# Patient Record
Sex: FEMALE | Race: OTHER | Employment: FULL TIME | URBAN - METROPOLITAN AREA
[De-identification: names, ages, dates, MRNs, and addresses within clinical notes are randomized per-mention and may not be internally consistent; named-entity substitution may affect disease eponyms.]

---

## 2017-04-10 ENCOUNTER — HOSPITAL ENCOUNTER (OUTPATIENT)
Dept: OTHER | Age: 27
Discharge: HOME | End: 2017-04-30
Attending: OBSTETRICS & GYNECOLOGY | Admitting: OBSTETRICS & GYNECOLOGY

## 2017-04-17 ENCOUNTER — ROUTINE PRENATAL (OUTPATIENT)
Dept: PERINATAL CARE | Age: 27
End: 2017-04-17

## 2017-04-17 DIAGNOSIS — O99.212 OBESITY COMPLICATING PREGNANCY IN SECOND TRIMESTER: ICD-10-CM

## 2017-04-17 DIAGNOSIS — Z36.89 ENCOUNTER FOR FETAL ANATOMIC SURVEY: Primary | ICD-10-CM

## 2017-05-15 ENCOUNTER — ROUTINE PRENATAL (OUTPATIENT)
Dept: PERINATAL CARE | Age: 27
End: 2017-05-15

## 2017-05-15 DIAGNOSIS — O99.213 OBESITY COMPLICATING PREGNANCY IN THIRD TRIMESTER: Primary | ICD-10-CM

## 2017-06-05 ENCOUNTER — ROUTINE PRENATAL (OUTPATIENT)
Dept: PERINATAL CARE | Age: 27
End: 2017-06-05

## 2017-06-05 DIAGNOSIS — O99.213 OBESITY COMPLICATING PREGNANCY IN THIRD TRIMESTER: Primary | ICD-10-CM

## 2017-06-12 ENCOUNTER — ROUTINE PRENATAL (OUTPATIENT)
Dept: PERINATAL CARE | Age: 27
End: 2017-06-12

## 2017-06-12 DIAGNOSIS — O99.213 OBESITY COMPLICATING PREGNANCY IN THIRD TRIMESTER: Primary | ICD-10-CM

## 2017-06-19 ENCOUNTER — ROUTINE PRENATAL (OUTPATIENT)
Dept: PERINATAL CARE | Age: 27
End: 2017-06-19

## 2017-06-19 DIAGNOSIS — O99.213 OBESITY COMPLICATING PREGNANCY IN THIRD TRIMESTER: Primary | ICD-10-CM

## 2017-06-26 ENCOUNTER — ROUTINE PRENATAL (OUTPATIENT)
Dept: PERINATAL CARE | Age: 27
End: 2017-06-26

## 2017-06-26 DIAGNOSIS — O99.213 OBESITY COMPLICATING PREGNANCY IN THIRD TRIMESTER: Primary | ICD-10-CM

## 2017-07-03 ENCOUNTER — ROUTINE PRENATAL (OUTPATIENT)
Dept: PERINATAL CARE | Age: 27
End: 2017-07-03

## 2017-07-03 DIAGNOSIS — O99.213 OBESITY COMPLICATING PREGNANCY IN THIRD TRIMESTER: Primary | ICD-10-CM

## 2017-07-10 ENCOUNTER — ROUTINE PRENATAL (OUTPATIENT)
Dept: PERINATAL CARE | Age: 27
End: 2017-07-10

## 2017-07-10 DIAGNOSIS — O99.213 OBESITY COMPLICATING PREGNANCY IN THIRD TRIMESTER: Primary | ICD-10-CM

## 2017-07-15 ENCOUNTER — HOSPITAL ENCOUNTER (OUTPATIENT)
Dept: OTHER | Age: 27
Discharge: HOME | End: 2017-09-19
Attending: ADVANCED PRACTICE MIDWIFE | Admitting: ADVANCED PRACTICE MIDWIFE

## 2017-07-17 ENCOUNTER — ROUTINE PRENATAL (OUTPATIENT)
Dept: PERINATAL CARE | Age: 27
End: 2017-07-17

## 2017-07-17 VITALS
SYSTOLIC BLOOD PRESSURE: 106 MMHG | BODY MASS INDEX: 35.02 KG/M2 | DIASTOLIC BLOOD PRESSURE: 71 MMHG | HEART RATE: 97 BPM | WEIGHT: 204 LBS

## 2017-07-17 DIAGNOSIS — O99.213 OBESITY COMPLICATING PREGNANCY IN THIRD TRIMESTER: Primary | ICD-10-CM

## 2017-07-17 DIAGNOSIS — Z36.9 ANTENATAL SCREENING ENCOUNTER: ICD-10-CM

## 2017-07-27 ENCOUNTER — ROUTINE PRENATAL (OUTPATIENT)
Dept: PERINATAL CARE | Age: 27
End: 2017-07-27

## 2017-07-27 DIAGNOSIS — Z36.9 ANTENATAL SCREENING ENCOUNTER: ICD-10-CM

## 2017-07-27 DIAGNOSIS — O99.213 OBESITY COMPLICATING PREGNANCY IN THIRD TRIMESTER: Primary | ICD-10-CM

## 2017-08-07 PROBLEM — O48.0 POST TERM PREGNANCY AT 41 WEEKS GESTATION: Status: ACTIVE | Noted: 2017-08-07

## 2017-08-07 PROBLEM — Z3A.41 POST TERM PREGNANCY AT 41 WEEKS GESTATION: Status: ACTIVE | Noted: 2017-08-07

## 2021-01-26 ENCOUNTER — TELEPHONE (OUTPATIENT)
Dept: BARIATRICS/WEIGHT MGMT | Age: 31
End: 2021-01-26

## 2021-03-10 ENCOUNTER — TELEPHONE (OUTPATIENT)
Dept: BARIATRICS/WEIGHT MGMT | Age: 31
End: 2021-03-10

## 2021-03-10 NOTE — TELEPHONE ENCOUNTER
Called as a new pt courtesy call - left message. Told patient to have new pt paperwork completely filled out, insurance card, and id and to arrive on time to appointment. If they didn't have the paperwork filled out and arrive on time may be rescheduled. Also stated if they didn't receive paperwork to let us know so we could get it to them another way. Left office number on message.  Told to arrive @ 21

## 2021-03-11 ENCOUNTER — OFFICE VISIT (OUTPATIENT)
Dept: BARIATRICS/WEIGHT MGMT | Age: 31
End: 2021-03-11
Payer: MEDICAID

## 2021-03-11 VITALS
WEIGHT: 263 LBS | HEIGHT: 64 IN | BODY MASS INDEX: 44.9 KG/M2 | TEMPERATURE: 97.4 F | RESPIRATION RATE: 18 BRPM | OXYGEN SATURATION: 98 % | DIASTOLIC BLOOD PRESSURE: 69 MMHG | HEART RATE: 99 BPM | SYSTOLIC BLOOD PRESSURE: 110 MMHG

## 2021-03-11 DIAGNOSIS — Z01.818 PREOPERATIVE CLEARANCE: ICD-10-CM

## 2021-03-11 DIAGNOSIS — M54.41 CHRONIC MIDLINE LOW BACK PAIN WITH RIGHT-SIDED SCIATICA: ICD-10-CM

## 2021-03-11 DIAGNOSIS — E66.01 MORBID OBESITY WITH BMI OF 45.0-49.9, ADULT (HCC): Primary | ICD-10-CM

## 2021-03-11 DIAGNOSIS — I10 ESSENTIAL HYPERTENSION: ICD-10-CM

## 2021-03-11 DIAGNOSIS — K21.9 CHRONIC GERD: ICD-10-CM

## 2021-03-11 DIAGNOSIS — G89.29 CHRONIC MIDLINE LOW BACK PAIN WITH RIGHT-SIDED SCIATICA: ICD-10-CM

## 2021-03-11 PROCEDURE — 99245 OFF/OP CONSLTJ NEW/EST HI 55: CPT | Performed by: SURGERY

## 2021-03-11 PROCEDURE — 1036F TOBACCO NON-USER: CPT | Performed by: SURGERY

## 2021-03-11 PROCEDURE — G8484 FLU IMMUNIZE NO ADMIN: HCPCS | Performed by: SURGERY

## 2021-03-11 PROCEDURE — G8427 DOCREV CUR MEDS BY ELIG CLIN: HCPCS | Performed by: SURGERY

## 2021-03-11 PROCEDURE — G8419 CALC BMI OUT NRM PARAM NOF/U: HCPCS | Performed by: SURGERY

## 2021-03-11 RX ORDER — AMLODIPINE BESYLATE 10 MG/1
TABLET ORAL DAILY
COMMUNITY
Start: 2021-03-02

## 2021-03-11 SDOH — HEALTH STABILITY: MENTAL HEALTH: HOW OFTEN DO YOU HAVE A DRINK CONTAINING ALCOHOL?: 2-3 TIMES A WEEK

## 2021-03-11 NOTE — PROGRESS NOTES
[facility]) Physicians   [employer] [facility] [employer]  Fidencio Stevens MD, [address] [facility], [address] [facility] [address] . Phone: [phone]  Fax: [phone]       Chief Complaint   Patient presents with    Bariatric, Initial Visit     NP, Creta Dakin           HPI:    Connor Tejada is a very pleasant 26 y.o. obese female ,   Body mass index is 45.14 kg/m². And multiple medical problems who is presenting for weight loss surgery evaluation and consultation by Dr. Kenyatta Zabala. Patient has been struggling for several years now with obesity. Patient feels the weight is an obstacle to achieve and perform things in daily living as well risk on health. Tries to diet, and exercise but can't keep the weight off. Patient tried Atkins Diet, Weight Watcher Anonymous, Cabbage Soup Diet, low carb and calorie restriction. Patient has participated in meal replacement/liquid diets - Slimfast.  Patient has participated in weight loss medications - Adipex 10 years ago and other regimens, but with no sustainable weight loss. Patient  is very determined to lose weight and be healthy, and is interested in surgical weight loss for future weight loss. .    Otherwise patient denies any nausea, vomiting, fevers, chills, shortness of breath, chest pain, constipation or urinary symptoms.         Obesity related problems Kevin Blair is dealing with:  Patient Active Problem List   Diagnosis     (spontaneous vaginal delivery)    Post term pregnancy at 39 weeks gestation    Essential hypertension    Preoperative clearance    Morbid obesity with BMI of 45.0-49.9, adult ([address]    Chronic GERD    Chronic midline low back pain with right-sided sciatica           Pain Assessment   Denies any abdominal pain     Past Medical History:   Diagnosis Date    Asthma     associated with seasonal allergies    Hypertension     Pruritic urticarial papules and plaques of pregnancy      Past Surgical History: Patient is oriented to person, place, and time. Vital signs are normal. Patient  appears well-developed and well-nourished. Patient  is active and cooperative. Non-toxic appearance. No distress. HENT:   Head: Normocephalic and atraumatic. Head is without laceration. Right Ear: External ear normal. No lacerations. No drainage, swelling or tenderness. Left Ear: External ear normal. No lacerations. No drainage, swelling or tenderness. Nose/Mouth/Throat: Patient is wearing mask due to Covid-19 pandemic precautions, following CDC and health authorities guidelines. Eyes: Conjunctivae, EOM and lids are normal. Pupils are equal, round, and reactive to light. Right eye exhibits no discharge. No foreign body present in the right eye. Left eye exhibits no discharge. No foreign body present in the left eye. No scleral icterus. Neck: Trachea normal and normal range of motion. Neck supple. No JVD present. No tracheal tenderness present. Carotid bruit is not present. No rigidity. No tracheal deviation and no edema present. No thyromegaly present. Cardiovascular: Normal rate, regular rhythm, normal heart sounds, intact distal pulses and normal pulses. Pulmonary/Chest: Effort normal and breath sounds normal. No stridor. No respiratory distress. Patient  has no wheezes. Patient has no rales. Patient exhibits no tenderness and no crepitus. Abdominal: Soft. Normal appearance and bowel sounds are normal. Patient exhibits no distension, no abdominal bruit, no ascites and no mass. There is no hepatosplenomegaly. There is no tenderness. There is no rigidity, no rebound, no guarding and no CVA tenderness. No hernia. Hernia confirmed negative in the ventral area. Musculoskeletal: Normal range of motion. Patient exhibits no edema or tenderness. Lymphadenopathy:        Head (right side): No submental, no submandibular, no preauricular, no posterior auricular and no occipital adenopathy present.         Head (left side): No submental, no submandibular, no preauricular, no posterior auricular and no occipital adenopathy present. Patient  has no cervical adenopathy. Right: No supraclavicular adenopathy present. Left: No supraclavicular adenopathy present. Neurological: Patient is alert and oriented to person, place, and time. Patient has normal strength. Coordination and gait normal. GCS eye subscore is 4. GCS verbal subscore is 5. GCS motor subscore is 6. Skin: Skin is warm and dry. No abrasion and no rash noted. Patient  is not diaphoretic. No cyanosis or erythema. Psychiatric: Patient has a normal mood and affect. speech is normal and behavior is normal. Cognition and memory are normal.         Ankita Adams was seen today for bariatric, initial visit. Diagnoses and all orders for this visit:    Morbid obesity with BMI of 45.0-49.9, adult ([facility] [address]  -     CBC Auto Differential; Future  -     Comprehensive Metabolic Panel; Future  -     Hemoglobin A1C; Future  -     Iron and TIBC; Future  -     Lipid Panel; Future  -     TSH with Reflex; Future  -     Vitamin A; Future  -     Vitamin B1, Whole Blood; Future  -     Vitamin B12 & Folate; Future  -     Vitamin D 25 Hydroxy; Future  -     Vitamin E; Future  -     Protime-INR; Future  -     Ambulatory referral to Cardiology    Essential hypertension  -     CBC Auto Differential; Future  -     Comprehensive Metabolic Panel; Future  -     Hemoglobin A1C; Future  -     Iron and TIBC; Future  -     Lipid Panel; Future  -     TSH with Reflex; Future  -     Vitamin A; Future  -     Vitamin B1, Whole Blood; Future  -     Vitamin B12 & Folate; Future  -     Vitamin D 25 Hydroxy; Future  -     Vitamin E; Future  -     Protime-INR; Future  -     Ambulatory referral to Cardiology    Preoperative clearance  -     CBC Auto Differential; Future  -     Comprehensive Metabolic Panel; Future  -     Hemoglobin A1C; Future  -     Iron and TIBC; Future  -     Lipid Panel;  Future  -     TSH with Reflex; Future  -     Vitamin A; Future  -     Vitamin B1, Whole Blood; Future  -     Vitamin B12 & Folate; Future  -     Vitamin D 25 Hydroxy; Future  -     Vitamin E; Future  -     Protime-INR; Future  -     Ambulatory referral to Cardiology    Chronic GERD  -     CBC Auto Differential; Future  -     Comprehensive Metabolic Panel; Future  -     Hemoglobin A1C; Future  -     Iron and TIBC; Future  -     Lipid Panel; Future  -     TSH with Reflex; Future  -     Vitamin A; Future  -     Vitamin B1, Whole Blood; Future  -     Vitamin B12 & Folate; Future  -     Vitamin D 25 Hydroxy; Future  -     Vitamin E; Future  -     Protime-INR; Future  -     Ambulatory referral to Cardiology    Chronic midline low back pain with right-sided sciatica  -     CBC Auto Differential; Future  -     Comprehensive Metabolic Panel; Future  -     Hemoglobin A1C; Future  -     Iron and TIBC; Future  -     Lipid Panel; Future  -     TSH with Reflex; Future  -     Vitamin A; Future  -     Vitamin B1, Whole Blood; Future  -     Vitamin B12 & Folate; Future  -     Vitamin D 25 Hydroxy; Future  -     Vitamin E; Future  -     Protime-INR; Future  -     Ambulatory referral to Cardiology          A/P  Katiana Vela is a very pleasant 26 y.o. female with Obesity,  Body mass index is 45.14 kg/m². and multiple obesity related co-morbidities. Katiana Vela is very motivated to lose weight and being more healthy. We discussed how her weight affects her overall health including:  Patient Active Problem List   Diagnosis     (spontaneous vaginal delivery)    Post term pregnancy at 39 weeks gestation    Essential hypertension    Preoperative clearance    Morbid obesity with BMI of 45.0-49.9, adult ([facility]    Chronic GERD    Chronic midline low back pain with right-sided sciatica      The patient underwent extensive dietary counseling. I have reviewed, discussed and agree with the dietary plan.   Medical weight loss and different surgical options were discussed in details with patient. Fredi Arriaga is interested in surgical weight loss for future weight loss. Patient is interested in Laparoscopic Sleeve Gastrectomy, which I believe is an excellent option. We will proceed with pre-operative work up labs and studies. Will also petition patient's  insurance for approval for this procedure. I advised the patient that we can't guarantee final insurance approval.    Patient received dietary handouts and education. Patient advised that its their responsibility to follow up for studies, referrals and/or labs ordered today. Also discussed in details the importance of follow up, as well following the recommendations and completing the whole program to improve outcomes when it comes to healthier lifestyle as well weight loss. Patient also advised about risks and benefits being on a strict dietary regimen as well using supplements. Patient agrees and wants to proceed with weight loss planning     Obesity as a disease is considered a high risk to patients overall health and should therefore be considered a high risk disease state. Now with Covid-19 pandemic, CDC and health authorities does classify obese patients as vulnerable and high risk as well. Which makes weight loss a priority for improvement of their wellbeing and overall health. CDC has issued the following statement as far Obese patients being at Increased Risk of being critically ill from SARS-Cov-2  \"Severe obesity increases the risk of a serious breathing problem called acute respiratory distress syndrome (ARDS), which is a major complication of AWQZK-21 and can cause difficulties with a doctors ability to provide respiratory support for seriously ill patients. People living with severe obesity can have multiple serious chronic diseases and underlying health conditions that can increase the risk of severe illness from COVID-19. \"       Patient Instructions   Patient received dietary handouts and education. Pre-operative work up Ordered:    - [employer]. - Psych Evaluation.   - Cardiac Clearance. - EGD (Upper Endoscopy). - Support Group Attendance. - Obtain letter of medical necessity (PCP Letter). - Quit Smoking,  Alcohol, Caffeine and Carbonated Drinks  - Obtain records for Weight History 2 yrs. - Start Regular Exercise and track your activities. - Start Tracking your food Intake and follow dietary guidelines. - Avoid Pregnancy for 2 yrs from date of surgery. (for female patients in childbearing age)  - F/U in 4 weeks. - F/U with Behaviorist         Patient advised that its their responsibility to follow up for studies, referrals and/or labs ordered today. Please note that some or all of this report was generated using voice recognition software. Please notify me in case of any questions about the content of this document, as some errors in transcription may have occurred .

## 2021-03-11 NOTE — PROGRESS NOTES
Aimee Padilla is a 26 y.o. female with a date of birth of 1990. Vitals:    03/11/21 1058   BP: 110/69   Pulse: 99   Resp: 18   Temp: 97.4 °F (36.3 °C)   SpO2: 98%    BMI: Body mass index is 45.14 kg/m². Obesity Classification: Class III    Weight History: Wt Readings from Last 3 Encounters:   03/11/21 263 lb (119.3 kg)   12/15/17 180 lb (81.6 kg)   08/06/17 200 lb (90.7 kg)       Patient's lowest adult weight was 185 lbs at age 32. Patient's highest adult weight was 263.8 lbs at age 26. Patient has participated in the following weight loss programs: Atkins Diet, Weight Watcher Anonymous, Cabbage Soup Diet, low carb and calorie restriction. Patient has participated in meal replacement/liquid diets - Slimfast.  Patient has participated in weight loss medications - Adipex 10 years go. Patient is not lactose intolerant. Patient does not have Pentecostalism/cultural food concerns. Patient does not have food allergies. Patient does tolerate artificial sweeteners. Patient does have a regular sleep/wake schedule; she feels like she sleeps pretty well  24 hour recall/food frequency chart:  Breakfast: no. water  Snack: no.   Lunch: yes. Fast food - burger and ff, regular soda [employer]  Snack: yes. Candy or chips  Dinner: yes. Fast food OR pasta, chix, veg  Snack: yes. Bowl of cereal OR ramen OR chips, soda  Drinks throughout the day: water, soda - 2 cans  Do you drink alcohol? Yes. How often/how much alcohol do you drink: 3 Glasses of wine per week. Patient does not meet the criteria for binge eating disorder. Patient does not have grazing. Patient does not have night eating. Patient does have a history of emotional eating or eating out of boredom. Surgery  Patient does feel confident in her ability to make these changes. The patient's expectations of post-surgical eating habits are realistic.     Patient states she does understand the consequences of not complying with post-op food guidelines. Patient states she does understands the long term changes in food intake that will be necessary for all occasions after surgery for the rest of her life. Patient is deemed nutritionally appropriate to proceed. Goals  Weight: 160-170  Health Improvement: improve HTN, asthma; avoid weight related illnesses    Assessment  Nutritional Needs: RMR=(9.99 x 119) + (6.25 x 163) - (4.92 x 30 y.o.) -161  = 1890 kcal x 1.4 (sedentary activity factor)= 2646 kcal - 1000 (for 2 lb weight loss/week)= 1646 kcal.    Plan  Plan/Recommendations: Start presurgical guidelines. Goals:   -Eat 4-5 times daily  -Avoid high fat and high sugar foods  -Include protein with all meals and snacks  -Avoid carbonation and caffeine  -Avoid calorie containing beverages  -Increase physical activity as tolerated    PES Statement:  Overweight/Obesity related to lack of exercise, sedentary lifestyle, unhealthy eating habits, and unsuccessful diet attempts as evidenced by BMI. Body mass index is 45.14 kg/m². Will follow up as necessary.     Mir Man

## 2021-03-11 NOTE — PATIENT INSTRUCTIONS
Patient received dietary handouts and education. Pre-operative work up Ordered:    - [employer]. - Psych Evaluation.   - Cardiac Clearance. - EGD (Upper Endoscopy). - Support Group Attendance. - Obtain letter of medical necessity (PCP Letter). - Quit Smoking,  Alcohol, Caffeine and Carbonated Drinks  - Obtain records for Weight History 2 yrs. - Start Regular Exercise and track your activities. - Start Tracking your food Intake and follow dietary guidelines. - Avoid Pregnancy for 2 yrs from date of surgery. (for female patients in childbearing age)  - F/U in 4 weeks. - F/U with Behaviorist         Patient advised that its their responsibility to follow up for studies, referrals and/or labs ordered today.

## 2021-04-06 ENCOUNTER — OFFICE VISIT (OUTPATIENT)
Dept: CARDIOLOGY CLINIC | Age: 31
End: 2021-04-06
Payer: MEDICAID

## 2021-04-06 VITALS
BODY MASS INDEX: 45.41 KG/M2 | HEART RATE: 95 BPM | HEIGHT: 64 IN | DIASTOLIC BLOOD PRESSURE: 68 MMHG | SYSTOLIC BLOOD PRESSURE: 112 MMHG | WEIGHT: 266 LBS | OXYGEN SATURATION: 95 %

## 2021-04-06 DIAGNOSIS — I10 ESSENTIAL HYPERTENSION: ICD-10-CM

## 2021-04-06 DIAGNOSIS — Z01.818 PREOPERATIVE CLEARANCE: Primary | ICD-10-CM

## 2021-04-06 PROCEDURE — 93000 ELECTROCARDIOGRAM COMPLETE: CPT | Performed by: INTERNAL MEDICINE

## 2021-04-06 PROCEDURE — 99203 OFFICE O/P NEW LOW 30 MIN: CPT | Performed by: INTERNAL MEDICINE

## 2021-04-06 PROCEDURE — G8427 DOCREV CUR MEDS BY ELIG CLIN: HCPCS | Performed by: INTERNAL MEDICINE

## 2021-04-06 PROCEDURE — G8417 CALC BMI ABV UP PARAM F/U: HCPCS | Performed by: INTERNAL MEDICINE

## 2021-04-06 PROCEDURE — 1036F TOBACCO NON-USER: CPT | Performed by: INTERNAL MEDICINE

## 2021-04-06 RX ORDER — MONTELUKAST SODIUM 5 MG/1
5 TABLET, CHEWABLE ORAL NIGHTLY
COMMUNITY

## 2021-04-06 NOTE — LETTER
[address] Cardiology - [address] [place] [id number] [facility]  Phone: [phone]  Fax: [phone]    Clarence Wolfe MD        April 6, 2021    [address]  [address] [place] [address]      To Whom It May Concern,     Emy Benítez 1990 is at low cardiac risk for surgery. If you have any questions or concerns, please don't hesitate to call.     Sincerely,         Clarence Wolfe MD

## 2021-04-06 NOTE — PROGRESS NOTES
[address]   CARDIAC EVALUATION NOTE  [phone]      PCP:  Perez Hill DO    Reason for Consultation/Chief Complaint: preop eval for wt loss surgery     Subjective   History of Present Illness:  Nuha Luis is a 26 y.o. patient with a history of HTN who presents for pre-operative cardiac clearance for bariatric surgery. She reports she is not as active due to losing her job a year ago due to the [place] pandemic. She denies DM or pregnancy issues. She denies HTN during pregnancies. She has been on BP medications for 2 years. She denies smoking. She is active with her children. She denies CP, palpitations, dizziness or syncope. She reports she can walk without limitations. Past Medical History:   has a past medical history of Asthma, Hypertension, and Pruritic urticarial papules and plaques of pregnancy. Surgical History:   has a past surgical history that includes Tonsillectomy. Social History:   reports that she has never smoked. She has never used smokeless tobacco. She reports current alcohol use. She reports that she does not use drugs. Family History:  family history is not on file. She was adopted. Home Medications:  Were reviewed and are listed in nursing record and/or below  Prior to Admission medications    Medication Sig Start Date End Date Taking? Authorizing Provider   montelukast (SINGULAIR) 5 MG chewable tablet Take 5 mg by mouth nightly   Yes Historical Provider, MD   amLODIPine (NORVASC) 10 MG tablet  3/2/21  Yes Historical Provider, MD   albuterol sulfate HFA (PROVENTIL HFA) 108 (90 Base) MCG/ACT inhaler Inhale 2 puffs into the lungs every 4 hours as needed for Wheezing 12/16/17  Yes Dolph Book, DO          Allergies:  Beef (bovine) protein and No known allergies     Review of Systems:   A 14 point review of symptoms completed. Pertinent positives identified in the HPI, all other review of symptoms negative as below.       Objective   PHYSICAL Stress Test:    Cath:    Studies:       I have reviewed labs and imaging/xray/diagnostic testing in this note. Assessment      1. Preoperative clearance    2. Essential hypertension                 Plan   1. Naveen Cano for surgery at low cardiac risk  2. Follow up as needed       Scribe's attestation: This note was scribed in the presence of Dr. Chris Abdullahi by Clarice Roth RN      Thank you for allowing us to participate in the care of [facility]. Please call me with any questions [phone]. Chris Abdullahi MD, [facility]   Interventional Cardiologist  [facility]  [phone] [facility]  [phone] [address] [place] [address]  4/6/2021 10:54 AM    I will address the patient's cardiac risk factors and adjusted pharmacologic treatment as needed. In addition, I have reinforced the need for patient directed risk factor modification. Tobacco use was discussed with the patient and educated on the negative effects and was asked not to use. All questions and concerns were addressed to the patient/family. Alternatives to my treatment were discussed. I, Dr Chris Abdullahi, personally performed the services described in this documentation, as scribed by the above signed scribe in my presence. It is both accurate and complete to my knowledge. I agree with the details independently gathered by the clinical support staff and the scribed note accurately describes my personal service to the patient.

## 2021-04-07 ENCOUNTER — HOSPITAL ENCOUNTER (OUTPATIENT)
Age: 31
Discharge: HOME | End: 2021-04-07
Payer: MEDICAID

## 2021-04-07 DIAGNOSIS — I10 ESSENTIAL HYPERTENSION: ICD-10-CM

## 2021-04-07 DIAGNOSIS — E66.01 MORBID OBESITY WITH BMI OF 45.0-49.9, ADULT (HCC): ICD-10-CM

## 2021-04-07 DIAGNOSIS — Z01.818 PREOPERATIVE CLEARANCE: ICD-10-CM

## 2021-04-07 DIAGNOSIS — G89.29 CHRONIC MIDLINE LOW BACK PAIN WITH RIGHT-SIDED SCIATICA: ICD-10-CM

## 2021-04-07 DIAGNOSIS — K21.9 CHRONIC GERD: ICD-10-CM

## 2021-04-07 DIAGNOSIS — M54.41 CHRONIC MIDLINE LOW BACK PAIN WITH RIGHT-SIDED SCIATICA: ICD-10-CM

## 2021-04-07 LAB
A/G RATIO: 1.5 (ref 1.1–2.2)
ALBUMIN SERPL-MCNC: 4.3 G/DL (ref 3.4–5)
ALP BLD-CCNC: 66 U/L (ref 40–129)
ALT SERPL-CCNC: 18 U/L (ref 10–40)
ANION GAP SERPL CALCULATED.3IONS-SCNC: 12 MMOL/L (ref 3–16)
AST SERPL-CCNC: 19 U/L (ref 15–37)
BASOPHILS ABSOLUTE: 0.1 K/UL (ref 0–0.2)
BASOPHILS RELATIVE PERCENT: 0.7 %
BILIRUB SERPL-MCNC: 0.6 MG/DL (ref 0–1)
BUN BLDV-MCNC: 11 MG/DL (ref 7–20)
CALCIUM SERPL-MCNC: 8.8 MG/DL (ref 8.3–10.6)
CHLORIDE BLD-SCNC: 101 MMOL/L (ref 99–110)
CHOLESTEROL, TOTAL: 158 MG/DL (ref 0–199)
CO2: 24 MMOL/L (ref 21–32)
CREAT SERPL-MCNC: 0.8 MG/DL (ref 0.6–1.1)
EOSINOPHILS ABSOLUTE: 0.6 K/UL (ref 0–0.6)
EOSINOPHILS RELATIVE PERCENT: 8 %
FOLATE: 17.52 NG/ML (ref 4.78–24.2)
GFR AFRICAN AMERICAN: >60
GFR NON-AFRICAN AMERICAN: >60
GLOBULIN: 2.8 G/DL
GLUCOSE BLD-MCNC: 72 MG/DL (ref 70–99)
HCT VFR BLD CALC: 40.8 % (ref 36–48)
HDLC SERPL-MCNC: 42 MG/DL (ref 40–60)
HEMOGLOBIN: 13.7 G/DL (ref 12–16)
INR BLD: 0.99 (ref 0.86–1.14)
IRON SATURATION: 42 % (ref 15–50)
IRON: 115 UG/DL (ref 37–145)
LDL CHOLESTEROL CALCULATED: 90 MG/DL
LYMPHOCYTES ABSOLUTE: 1.9 K/UL (ref 1–5.1)
LYMPHOCYTES RELATIVE PERCENT: 23.8 %
MCH RBC QN AUTO: 28.7 PG (ref 26–34)
MCHC RBC AUTO-ENTMCNC: 33.6 G/DL (ref 31–36)
MCV RBC AUTO: 85.5 FL (ref 80–100)
MONOCYTES ABSOLUTE: 0.7 K/UL (ref 0–1.3)
MONOCYTES RELATIVE PERCENT: 8.2 %
NEUTROPHILS ABSOLUTE: 4.7 K/UL (ref 1.7–7.7)
NEUTROPHILS RELATIVE PERCENT: 59.3 %
PDW BLD-RTO: 13.6 % (ref 12.4–15.4)
PLATELET # BLD: 413 K/UL (ref 135–450)
PMV BLD AUTO: 7.3 FL (ref 5–10.5)
POTASSIUM SERPL-SCNC: 3.7 MMOL/L (ref 3.5–5.1)
PROTHROMBIN TIME: 11.5 SEC (ref 10–13.2)
RBC # BLD: 4.78 M/UL (ref 4–5.2)
SODIUM BLD-SCNC: 137 MMOL/L (ref 136–145)
TOTAL IRON BINDING CAPACITY: 272 UG/DL (ref 260–445)
TOTAL PROTEIN: 7.1 G/DL (ref 6.4–8.2)
TRIGL SERPL-MCNC: 130 MG/DL (ref 0–150)
TSH REFLEX: 1.59 UIU/ML (ref 0.27–4.2)
VITAMIN B-12: 1173 PG/ML (ref 211–911)
VITAMIN D 25-HYDROXY: 18 NG/ML
VLDLC SERPL CALC-MCNC: 26 MG/DL
WBC # BLD: 8 K/UL (ref 4–11)

## 2021-04-07 PROCEDURE — 80061 LIPID PANEL: CPT

## 2021-04-07 PROCEDURE — 84446 ASSAY OF VITAMIN E: CPT

## 2021-04-07 PROCEDURE — 82746 ASSAY OF FOLIC ACID SERUM: CPT

## 2021-04-07 PROCEDURE — 85610 PROTHROMBIN TIME: CPT

## 2021-04-07 PROCEDURE — 85025 COMPLETE CBC W/AUTO DIFF WBC: CPT

## 2021-04-07 PROCEDURE — 82607 VITAMIN B-12: CPT

## 2021-04-07 PROCEDURE — 83540 ASSAY OF IRON: CPT

## 2021-04-07 PROCEDURE — 80053 COMPREHEN METABOLIC PANEL: CPT

## 2021-04-07 PROCEDURE — 83550 IRON BINDING TEST: CPT

## 2021-04-07 PROCEDURE — 84443 ASSAY THYROID STIM HORMONE: CPT

## 2021-04-07 PROCEDURE — 36415 COLL VENOUS BLD VENIPUNCTURE: CPT

## 2021-04-07 PROCEDURE — 84590 ASSAY OF VITAMIN A: CPT

## 2021-04-07 PROCEDURE — 83036 HEMOGLOBIN GLYCOSYLATED A1C: CPT

## 2021-04-07 PROCEDURE — 82306 VITAMIN D 25 HYDROXY: CPT

## 2021-04-07 PROCEDURE — 84425 ASSAY OF VITAMIN B-1: CPT

## 2021-04-08 ENCOUNTER — OFFICE VISIT (OUTPATIENT)
Dept: BARIATRICS/WEIGHT MGMT | Age: 31
End: 2021-04-08
Payer: MEDICAID

## 2021-04-08 VITALS
WEIGHT: 264.8 LBS | BODY MASS INDEX: 45.21 KG/M2 | OXYGEN SATURATION: 98 % | DIASTOLIC BLOOD PRESSURE: 81 MMHG | HEIGHT: 64 IN | RESPIRATION RATE: 18 BRPM | SYSTOLIC BLOOD PRESSURE: 117 MMHG | HEART RATE: 98 BPM

## 2021-04-08 DIAGNOSIS — R73.03 PREDIABETES: ICD-10-CM

## 2021-04-08 DIAGNOSIS — G89.29 CHRONIC MIDLINE LOW BACK PAIN WITH RIGHT-SIDED SCIATICA: ICD-10-CM

## 2021-04-08 DIAGNOSIS — E66.01 MORBID OBESITY WITH BMI OF 45.0-49.9, ADULT (HCC): Primary | ICD-10-CM

## 2021-04-08 DIAGNOSIS — E55.9 VITAMIN D DEFICIENCY: ICD-10-CM

## 2021-04-08 DIAGNOSIS — K21.9 CHRONIC GERD: ICD-10-CM

## 2021-04-08 DIAGNOSIS — M54.41 CHRONIC MIDLINE LOW BACK PAIN WITH RIGHT-SIDED SCIATICA: ICD-10-CM

## 2021-04-08 DIAGNOSIS — I10 ESSENTIAL HYPERTENSION: ICD-10-CM

## 2021-04-08 LAB
ESTIMATED AVERAGE GLUCOSE: 116.9 MG/DL
HBA1C MFR BLD: 5.7 %

## 2021-04-08 PROCEDURE — G8427 DOCREV CUR MEDS BY ELIG CLIN: HCPCS | Performed by: SURGERY

## 2021-04-08 PROCEDURE — 99214 OFFICE O/P EST MOD 30 MIN: CPT | Performed by: SURGERY

## 2021-04-08 PROCEDURE — 1036F TOBACCO NON-USER: CPT | Performed by: SURGERY

## 2021-04-08 PROCEDURE — G8417 CALC BMI ABV UP PARAM F/U: HCPCS | Performed by: SURGERY

## 2021-04-08 NOTE — PROGRESS NOTES
Kate Osorio gained 1 lbs over the past month. Pt is frustrated with weight gain; she had 2 aunts pass away this past month  Breakfast: none    Snack: n/a    Lunch: early lunch - salad with chix or shrimp OR chix, veggies    Snack: nuts    Dinner: fish OR ground turkey tacos    Snack: peanut butter    Is pt consuming smaller portions? yes she is working to monitor portions    Is pt consuming at least 64 oz of fluids per day? yes water only but always 64oz    Is pt consuming carbonated, caffeinated, or sugary beverages? no; has eliminated soda    Has pt sampled Unjury and/or Nectar protein?  Not yet; reviewed product info    Exercise: walking 2x week    Plan/Recommendations: focus on adding protein based brkst    Handouts: none    Per Wade

## 2021-04-08 NOTE — PROGRESS NOTES
[facility]) Physicians   [employer] [facility] [employer]  Karlos Garcia MD, [address] [facility], [address] [place] [address]    Ximena Beltrán [address] . Phone: [phone]  Fax: [phone]          Chief Complaint   Patient presents with    Obesity     2nd pre-surg         HPI:     Razia Oliver is a very pleasant 26 y.o. female with Body mass index is 45.45 kg/m². / Chronic Obesity. Yi Gonzalez has been struggling for several years now with obesity. Yi Gonzalez feels the weight is an obstacle to achieve and perform things in daily living as well risk on health. Patient  is very determined to lose weight and be healthy, and is working towards  surgical weight loss to achieve this goal. Pre-operative clearance and work up pending. Working hard to keep good dietary habits as well level of activity. Patient denies any nausea, vomiting, fevers, chills, shortness of breath, chest pain, cough, constipation or difficulty urinating. Pain Assessment   Denies any abdominal pain       Past Medical History:   Diagnosis Date    Asthma     associated with seasonal allergies    Hypertension     Pruritic urticarial papules and plaques of pregnancy      Past Surgical History:   Procedure Laterality Date    TONSILLECTOMY       Family History   Adopted: Yes     Social History     Tobacco Use    Smoking status: Never Smoker    Smokeless tobacco: Never Used   Substance Use Topics    Alcohol use: Yes     Frequency: 2-3 times a week     Drinks per session: 3 or 4     Comment: socially      I counseled the patient on the importance of not smoking and risks of ETOH. Allergies   Allergen Reactions    Beef (Bovine) Protein     No Known Allergies      Vitals:    04/08/21 1249   BP: 117/81   Pulse: 98   Resp: 18   SpO2: 98%   Weight: 264 lb 12.8 oz (120.1 kg)   Height: 5' 4\" (1.626 m)       Body mass index is 45.45 kg/m².     Lab Results   Component Value Date    WBC 8.0 04/07/2021    RBC 4.78 04/07/2021    HGB 13.7 04/07/2021 HCT 40.8 04/07/2021    MCV 85.5 04/07/2021    MCH 28.7 04/07/2021    MCHC 33.6 04/07/2021    MPV 7.3 04/07/2021    NEUTOPHILPCT 59.3 04/07/2021    LYMPHOPCT 23.8 04/07/2021    MONOPCT 8.2 04/07/2021    EOSRELPCT 8.0 04/07/2021    BASOPCT 0.7 04/07/2021    NEUTROABS 4.7 04/07/2021    LYMPHSABS 1.9 04/07/2021    MONOSABS 0.7 04/07/2021    EOSABS 0.6 04/07/2021     Lab Results   Component Value Date     04/07/2021    K 3.7 04/07/2021     04/07/2021    CO2 24 04/07/2021    ANIONGAP 12 04/07/2021    GLUCOSE 72 04/07/2021    BUN 11 04/07/2021    CREATININE 0.8 04/07/2021    LABGLOM >60 04/07/2021    GFRAA >60 04/07/2021    GFRAA >60 08/22/2010    CALCIUM 8.8 04/07/2021    PROT 7.1 04/07/2021    PROT 5.9 08/22/2010    LABALBU 4.3 04/07/2021    AGRATIO 1.5 04/07/2021    BILITOT 0.6 04/07/2021    ALKPHOS 66 04/07/2021    ALT 18 04/07/2021    AST 19 04/07/2021    GLOB 2.8 04/07/2021     Lab Results   Component Value Date    CHOL 158 04/07/2021    TRIG 130 04/07/2021    HDL 42 04/07/2021    LDLCALC 90 04/07/2021    LABVLDL 26 04/07/2021     Lab Results   Component Value Date    TSHREFLEX 1.59 04/07/2021     Lab Results   Component Value Date    IRON 115 04/07/2021    TIBC 272 04/07/2021    LABIRON 42 04/07/2021     Lab Results   Component Value Date    UEQVQKXC16 1173 04/07/2021    FOLATE 17.52 04/07/2021     Lab Results   Component Value Date    VITD25 18.0 04/07/2021     Lab Results   Component Value Date    LABA1C 5.7 04/07/2021    .9 04/07/2021         Current Outpatient Medications:     vitamin D (CHOLECALCIFEROL) 59546 UNIT CAPS, Take 1 capsule by mouth once a week, Disp: 12 capsule, Rfl: 0    Cholecalciferol 50 MCG (2000 UT) TABS, Take 1 tablet by mouth daily Take 1 tablet by mouth daily.  Start this medication after you finish the weekly regimen, Disp: 90 tablet, Rfl: 2    montelukast (SINGULAIR) 5 MG chewable tablet, Take 5 mg by mouth nightly, Disp: , Rfl:     amLODIPine (NORVASC) 10 MG tablet, , Disp: , Rfl:     albuterol sulfate HFA (PROVENTIL HFA) 108 (90 Base) MCG/ACT inhaler, Inhale 2 puffs into the lungs every 4 hours as needed for Wheezing, Disp: 1 Inhaler, Rfl: 1    Review of Systems - History obtained from the patient  General ROS: negative  Psychological ROS: negative  Ophthalmic ROS: negative  Neurological ROS: negative  ENT ROS: negative  Allergy and Immunology ROS: negative  Hematological and Lymphatic ROS: negative  Endocrine ROS: negative  Breast ROS: negative  Respiratory ROS: negative  Cardiovascular ROS: negative  Gastrointestinal ROS:negative  Genito-Urinary ROS: negative  Musculoskeletal ROS: negative   Skin ROS: negative    Physical Exam   Vitals Reviewed   Constitutional: Patient is oriented to person, place, and time. Patient appears well-developed and well-nourished. Patient is active and cooperative. Non-toxic appearance. No distress. HENT:   Head: Normocephalic and atraumatic. Head is without abrasion and without laceration. Hair is normal.   Right Ear: External ear normal. No lacerations. No drainage, swelling . Left Ear: External ear normal. No lacerations. No drainage, swelling. Nose/Mouth: face mask in place  Eyes: Conjunctivae, EOM and lids are normal. Right eye exhibits no discharge. No foreign body present in the right eye. Left eye exhibits no discharge. No foreign body present in the left eye. No scleral icterus. Neck: Trachea normal and normal range of motion. No JVD present. Pulmonary/Chest: Effort normal. No accessory muscle usage or stridor. No apnea. No respiratory distress. Cardiovascular: Normal rate and no JVD. Abdominal: Normal appearance. Patient exhibits no distension. Abdomen is soft, obese, non tender. Musculoskeletal: Normal range of motion. Patient exhibits no edema. Neurological: Patient is alert and oriented to person, place, and time. Patient has normal strength. GCS eye subscore is 4. GCS verbal subscore is 5.  GCS motor subscore is

## 2021-04-09 LAB
ALPHA-TOCOPHEROL: 7.9 MG/L (ref 5.5–18)
GAMMA-TOCOPHEROL: 2.6 MG/L (ref 0–6)
RETINYL PALMITATE: <0.02 MG/L (ref 0–0.1)
VITAMIN A LEVEL: 0.53 MG/L (ref 0.3–1.2)
VITAMIN A, INTERP: NORMAL

## 2021-04-10 PROBLEM — Z01.818 PREOPERATIVE CLEARANCE: Status: RESOLVED | Noted: 2021-03-11 | Resolved: 2021-04-10

## 2021-04-10 LAB — VITAMIN B1 WHOLE BLOOD: 137 NMOL/L (ref 70–180)

## 2021-04-19 ENCOUNTER — OFFICE VISIT (OUTPATIENT)
Dept: PRIMARY CARE CLINIC | Age: 31
End: 2021-04-19
Payer: MEDICAID

## 2021-04-19 DIAGNOSIS — Z01.818 PREOP TESTING: Primary | ICD-10-CM

## 2021-04-19 LAB — SARS-COV-2: NOT DETECTED

## 2021-04-19 PROCEDURE — G8417 CALC BMI ABV UP PARAM F/U: HCPCS | Performed by: NURSE PRACTITIONER

## 2021-04-19 PROCEDURE — 99211 OFF/OP EST MAY X REQ PHY/QHP: CPT | Performed by: NURSE PRACTITIONER

## 2021-04-19 PROCEDURE — G8428 CUR MEDS NOT DOCUMENT: HCPCS | Performed by: NURSE PRACTITIONER

## 2021-04-19 NOTE — PATIENT INSTRUCTIONS
Advance Care Planning  People with COVID-19 may have no symptoms, mild symptoms, such as fever, cough, and shortness of breath or they may have more severe illness, developing severe and fatal pneumonia. As a result, Advance Care Planning with attention to naming a health care decision maker (someone you trust to make healthcare decisions for you if you could not speak for yourself) and sharing other health care preferences is important BEFORE a possible health crisis. Please contact your Primary Care Provider to discuss Advance Care Planning. Preventing the Spread of Coronavirus Disease 2019 in Homes and Residential Communities  For the most recent information go to [email or web address]    Prevention steps for People with confirmed or suspected COVID-19 (including persons under investigation) who do not need to be hospitalized  and   People with confirmed COVID-19 who were hospitalized and determined to be medically stable to go home    Your healthcare provider and public health staff will evaluate whether you can be cared for at home. If it is determined that you do not need to be hospitalized and can be isolated at home, you will be monitored by staff from your local or state health department. You should follow the prevention steps below until a healthcare provider or local or state health department says you can return to your normal activities. Stay home except to get medical care  People who are mildly ill with COVID-19 are able to isolate at home during their illness. You should restrict activities outside your home, except for getting medical care. Do not go to work, school, or public areas. Avoid using public transportation, ride-sharing, or taxis. Separate yourself from other people and animals in your home  People: As much as possible, you should stay in a specific room and away from other people in your home.  Also, you should use a separate

## 2021-04-19 NOTE — PROGRESS NOTES
Patient reached __X__ yes  _____ no   VM instructions left ____ yes   phone number ________                                ____ no-office notified          Date __4/23/21  1045_______  Time __0845_____  Arrival __hosp-endo____    Nothing to eat or drink after midnight-follow your doctors prep instructions-this may include taking a second dose of your prep after midnight  Responsible adult 25 or older to stay on site while you are here-drive you home-stay with you after  Follow any instructions your doctors office has given you  Bring a complete list of all your medications and supplements including name,dose,how often taken the day of your procedure  If you normally take the following medications in the morning please do so the AM of your procedure with a small sip of water       Heart,blood pressure,seizure,thyroid or breathing medications-use your inhalers       DO NOT take blood pressure medications ending in \"davey\" or \"pril\" the AM of procedure or evening prior  Take half or your normal dose of any long acting insulins the night before your procedure-do not take any diabetic medications the AM of procedure  Follow your doctors instructions regarding stopping or taking  any blood thinners-if you do not have instructions-call them  Any questions call your doctor  Other ___take amlodipine am of procedure___________________________________________________________      COVID TEST     _X_ done  4/19/21 where _[place]___  __ scheduled _____ where ___  __ other __________        VISITOR POLICY(subject to change)         There is a one visitor policy at [facility] for all surgeries and endoscopies. Whether the visitor can stay or will be asked to wait in the car will depend on the current policy and if social distancing can be maintained. The policy is subject to change at any time. Please make sure the visitor has a cell phone that is on,charged and able to accept calls, as this may be the way that the staff communicates with them.Pain management is NO VISITOR policyThe patients ride is expected to remain in the car with a cell phone for communication. If the ride is leaving the hospital grounds please make sure they are back in time for pickup. Have the patient inform the staff on arrival what their rides plans are while the patient is in the facility. At the MAIN there is one visitor allowed. Please note that the visitor policy is subject to change.

## 2021-04-19 NOTE — PROGRESS NOTES
Kate Osorio received a viral test for COVID-19. They were educated on isolation and quarantine as appropriate. For any symptoms, they were directed to seek care from their PCP, given contact information to establish with a doctor, directed to an urgent care or the emergency room.

## 2021-04-23 ENCOUNTER — ANESTHESIA (OUTPATIENT)
Dept: ENDOSCOPY | Age: 31
End: 2021-04-23
Payer: MEDICAID

## 2021-04-23 ENCOUNTER — ANESTHESIA EVENT (OUTPATIENT)
Dept: ENDOSCOPY | Age: 31
End: 2021-04-23
Payer: MEDICAID

## 2021-04-23 ENCOUNTER — HOSPITAL ENCOUNTER (OUTPATIENT)
Age: 31
Setting detail: OUTPATIENT SURGERY
Discharge: HOME | End: 2021-04-23
Attending: SURGERY | Admitting: SURGERY
Payer: MEDICAID

## 2021-04-23 VITALS
WEIGHT: 259 LBS | BODY MASS INDEX: 44.22 KG/M2 | SYSTOLIC BLOOD PRESSURE: 144 MMHG | RESPIRATION RATE: 16 BRPM | HEIGHT: 64 IN | TEMPERATURE: 97.4 F | HEART RATE: 89 BPM | OXYGEN SATURATION: 100 % | DIASTOLIC BLOOD PRESSURE: 78 MMHG

## 2021-04-23 VITALS — DIASTOLIC BLOOD PRESSURE: 70 MMHG | SYSTOLIC BLOOD PRESSURE: 141 MMHG | OXYGEN SATURATION: 98 %

## 2021-04-23 PROBLEM — E66.01 MORBID OBESITY WITH BMI OF 40.0-44.9, ADULT (HCC): Status: ACTIVE | Noted: 2021-04-23

## 2021-04-23 LAB — HCG(URINE) PREGNANCY TEST: NEGATIVE

## 2021-04-23 PROCEDURE — 3609012400 HC EGD TRANSORAL BIOPSY SINGLE/MULTIPLE: Performed by: SURGERY

## 2021-04-23 PROCEDURE — 7100000011 HC PHASE II RECOVERY - ADDTL 15 MIN: Performed by: SURGERY

## 2021-04-23 PROCEDURE — 2500000003 HC RX 250 WO HCPCS: Performed by: NURSE ANESTHETIST, CERTIFIED REGISTERED

## 2021-04-23 PROCEDURE — 84703 CHORIONIC GONADOTROPIN ASSAY: CPT

## 2021-04-23 PROCEDURE — 2580000003 HC RX 258: Performed by: ANESTHESIOLOGY

## 2021-04-23 PROCEDURE — 7100000010 HC PHASE II RECOVERY - FIRST 15 MIN: Performed by: SURGERY

## 2021-04-23 PROCEDURE — 2709999900 HC NON-CHARGEABLE SUPPLY: Performed by: SURGERY

## 2021-04-23 PROCEDURE — 43239 EGD BIOPSY SINGLE/MULTIPLE: CPT | Performed by: SURGERY

## 2021-04-23 PROCEDURE — 6360000002 HC RX W HCPCS: Performed by: NURSE ANESTHETIST, CERTIFIED REGISTERED

## 2021-04-23 PROCEDURE — 88342 IMHCHEM/IMCYTCHM 1ST ANTB: CPT

## 2021-04-23 PROCEDURE — 3700000000 HC ANESTHESIA ATTENDED CARE: Performed by: SURGERY

## 2021-04-23 PROCEDURE — 88305 TISSUE EXAM BY PATHOLOGIST: CPT

## 2021-04-23 RX ORDER — LIDOCAINE HYDROCHLORIDE 20 MG/ML
INJECTION, SOLUTION INFILTRATION; PERINEURAL PRN
Status: DISCONTINUED | OUTPATIENT
Start: 2021-04-23 | End: 2021-04-23 | Stop reason: SDUPTHER

## 2021-04-23 RX ORDER — FAMOTIDINE 20 MG/1
20 TABLET, FILM COATED ORAL DAILY
Qty: 60 TABLET | Refills: 3 | Status: SHIPPED | OUTPATIENT
Start: 2021-04-23 | End: 2021-12-01 | Stop reason: ALTCHOICE

## 2021-04-23 RX ORDER — PROPOFOL 10 MG/ML
INJECTION, EMULSION INTRAVENOUS PRN
Status: DISCONTINUED | OUTPATIENT
Start: 2021-04-23 | End: 2021-04-23 | Stop reason: SDUPTHER

## 2021-04-23 RX ORDER — SODIUM CHLORIDE 9 MG/ML
INJECTION, SOLUTION INTRAVENOUS CONTINUOUS
Status: DISCONTINUED | OUTPATIENT
Start: 2021-04-23 | End: 2021-04-23 | Stop reason: HOSPADM

## 2021-04-23 RX ORDER — BUDESONIDE AND FORMOTEROL FUMARATE DIHYDRATE 160; 4.5 UG/1; UG/1
2 AEROSOL RESPIRATORY (INHALATION) DAILY
COMMUNITY

## 2021-04-23 RX ADMIN — LIDOCAINE HYDROCHLORIDE 100 MG: 20 INJECTION, SOLUTION INFILTRATION; PERINEURAL at 10:07

## 2021-04-23 RX ADMIN — SODIUM CHLORIDE: 9 INJECTION, SOLUTION INTRAVENOUS at 09:16

## 2021-04-23 RX ADMIN — PROPOFOL 100 MG: 10 INJECTION, EMULSION INTRAVENOUS at 10:07

## 2021-04-23 RX ADMIN — PROPOFOL 100 MG: 10 INJECTION, EMULSION INTRAVENOUS at 10:10

## 2021-04-23 ASSESSMENT — PAIN - FUNCTIONAL ASSESSMENT: PAIN_FUNCTIONAL_ASSESSMENT: 0-10

## 2021-04-23 ASSESSMENT — PULMONARY FUNCTION TESTS
PIF_VALUE: 1

## 2021-04-23 ASSESSMENT — ENCOUNTER SYMPTOMS: SHORTNESS OF BREATH: 0

## 2021-04-23 NOTE — PROGRESS NOTES
Teaching/ education completed for home care including pain management, ACTIVITY,SAFETY PRECAUTIONS and infection control. Patient verbalized understanding.

## 2021-04-23 NOTE — H&P
Department of [facility] Physicians   [employer] [facility] [employer]  Attending Pre-operative History and Physical      DIAGNOSIS:  Obesity    INDICATION:  Pre-op    PROCEDURE:  EGD    CHIEF COMPLAINT:  Obesity    History Obtained From:  patient    HISTORY OF PRESENT ILLNESS:    The patient is a 26 y.o. female with significant past medical history of   Patient Active Problem List   Diagnosis     (spontaneous vaginal delivery)    Post term pregnancy at 39 weeks gestation    Essential hypertension    Morbid obesity with BMI of 45.0-49.9, adult ([facility] [address]    Chronic GERD    Chronic midline low back pain with right-sided sciatica    Prediabetes    Vitamin D deficiency    Morbid obesity with BMI of 40.0-44.9, adult ([facility] [address]      who presents for pre-op EGD    Past Medical History:        Diagnosis Date    Asthma     associated with seasonal allergies    Hypertension     Pruritic urticarial papules and plaques of pregnancy      Past Surgical History:        Procedure Laterality Date    TONSILLECTOMY       Medications Prior to Admission:   Medications Prior to Admission: budesonide-formoterol (SYMBICORT) 160-4.5 MCG/ACT AERO, Inhale 2 puffs into the lungs daily  montelukast (SINGULAIR) 5 MG chewable tablet, Take 5 mg by mouth nightly  amLODIPine (NORVASC) 10 MG tablet, daily   albuterol sulfate HFA (PROVENTIL HFA) 108 (90 Base) MCG/ACT inhaler, Inhale 2 puffs into the lungs every 4 hours as needed for Wheezing  vitamin D (CHOLECALCIFEROL) 22725 UNIT CAPS, Take 1 capsule by mouth once a week  Cholecalciferol 50 MCG (2000 UT) TABS, Take 1 tablet by mouth daily Take 1 tablet by mouth daily. Start this medication after you finish the weekly regimen    Allergies:  No known allergies    Social History:   TOBACCO:   reports that she has never smoked. She has never used smokeless tobacco.  ETOH:   reports current alcohol use.   Family History:       Adopted: Yes         REVIEW OF motor subscore is 6. Skin: Skin is warm and dry. No abrasion and no rash noted. Patient is not diaphoretic. No cyanosis or erythema. Psychiatric: Patient has a normal mood and affect. Speech is normal and behavior is normal. Cognition and memory are normal.       DATA:  CBC:   Lab Results   Component Value Date    WBC 8.0 04/07/2021    RBC 4.78 04/07/2021    HGB 13.7 04/07/2021    HCT 40.8 04/07/2021    MCV 85.5 04/07/2021    MCH 28.7 04/07/2021    MCHC 33.6 04/07/2021    RDW 13.6 04/07/2021     04/07/2021    MPV 7.3 04/07/2021     CMP:    Lab Results   Component Value Date     04/07/2021    K 3.7 04/07/2021     04/07/2021    CO2 24 04/07/2021    BUN 11 04/07/2021    CREATININE 0.8 04/07/2021    GFRAA >60 04/07/2021    GFRAA >60 08/22/2010    AGRATIO 1.5 04/07/2021    LABGLOM >60 04/07/2021    GLUCOSE 72 04/07/2021    PROT 7.1 04/07/2021    PROT 5.9 08/22/2010    LABALBU 4.3 04/07/2021    CALCIUM 8.8 04/07/2021    BILITOT 0.6 04/07/2021    ALKPHOS 66 04/07/2021    AST 19 04/07/2021    ALT 18 04/07/2021       ASSESSMENT AND PLAN:    1. Patient is a 26 y.o. female with above specified procedure planned EGD with deep sedation  2. Procedure options, risks and benefits reviewed with patient. Patient expresses understanding.

## 2021-04-23 NOTE — ANESTHESIA PRE PROCEDURE
Department of Anesthesiology  Preprocedure Note       Name:  Kim Stewart   Age:  26 y.o.  :  1990                                          MRN:  [id number]         Date:  2021      Surgeon: Mele Edgar): Justice Black MD    Procedure: Procedure(s):  EGD ESOPHAGOGASTRODUODENOSCOPY    Medications prior to admission:   Prior to Admission medications    Medication Sig Start Date End Date Taking? Authorizing Provider   budesonide-formoterol (SYMBICORT) 160-4.5 MCG/ACT AERO Inhale 2 puffs into the lungs daily   Yes Historical Provider, MD   montelukast (SINGULAIR) 5 MG chewable tablet Take 5 mg by mouth nightly   Yes Historical Provider, MD   amLODIPine (NORVASC) 10 MG tablet daily  3/2/21  Yes Historical Provider, MD   albuterol sulfate HFA (PROVENTIL HFA) 108 (90 Base) MCG/ACT inhaler Inhale 2 puffs into the lungs every 4 hours as needed for Wheezing 17  Yes Grupo Mcintosh DO   vitamin D (CHOLECALCIFEROL) 50529 UNIT CAPS Take 1 capsule by mouth once a week 21  Justice Black MD   Cholecalciferol 50 MCG (2000 UT) TABS Take 1 tablet by mouth daily Take 1 tablet by mouth daily. Start this medication after you finish the weekly regimen 21   Justice Black MD       Current medications:    No current facility-administered medications for this encounter. Allergies:     Allergies   Allergen Reactions    No Known Allergies        Problem List:    Patient Active Problem List   Diagnosis Code     (spontaneous vaginal delivery) O80    Post term pregnancy at 39 weeks gestation O46.0, Z3A.41    Essential hypertension I10    Morbid obesity with BMI of 45.0-49.9, adult ([facility] [address] E66.01, Z68.42    Chronic GERD K21.9    Chronic midline low back pain with right-sided sciatica M54.41, G89.29    Prediabetes R73.03    Vitamin D deficiency E55.9       Past Medical History:        Diagnosis Date    Asthma     associated with seasonal allergies    Hypertension     Pruritic urticarial 01/12/2010        ABGs: No results found for: PHART, PO2ART, JFX5OUD, OEB6EWA, BEART, Z8FWUUZQ     Type & Screen (If Applicable):  Lab Results   Component Value Date    LABABO O 01/12/2010    LABRH Positive 01/12/2010       Drug/Infectious Status (If Applicable):  No results found for: HIV, HEPCAB    COVID-19 Screening (If Applicable):   Lab Results   Component Value Date    COVID19 Not Detected 04/19/2021           Anesthesia Evaluation  Patient summary reviewed and Nursing notes reviewed no history of anesthetic complications:   Airway: Mallampati: I  TM distance: >3 FB   Neck ROM: full  Mouth opening: > = 3 FB Dental: normal exam         Pulmonary:   (+) asthma:     (-) shortness of breath                           Cardiovascular:    (+) hypertension:,     (-)  angina                Neuro/Psych:      (-) CVA           GI/Hepatic/Renal:   (+) GERD:, morbid obesity     (-) liver disease       Endo/Other:        (-) diabetes mellitus, hypothyroidism               Abdominal:           Vascular:     - PVD. Anesthesia Plan      MAC     ASA 3       Induction: intravenous. Anesthetic plan and risks discussed with patient. Plan discussed with CRNA.                   Marolyn Cushing, MD   4/23/2021

## 2021-04-23 NOTE — ANESTHESIA POSTPROCEDURE EVALUATION
Department of Anesthesiology  Postprocedure Note    Patient: Emy Benítez  MRN: [id number]  YOB: 1990  Date of evaluation: 4/23/2021  Time:  10:37 AM     Procedure Summary     Date: 04/23/21 Room / Location: [address] [place] [address]    Anesthesia Start: 1004 Anesthesia Stop: 7765    Procedure: EGD BIOPSY (N/A Abdomen) Diagnosis: (GERD K21.9)    Surgeons: Robyn Silver MD Responsible Provider: Nafisa Patel MD    Anesthesia Type: MAC ASA Status: 3          Anesthesia Type: MAC    Evan Phase I:      Evan Phase II: Evan Score: 10    Last vitals: Reviewed and per EMR flowsheets.        Anesthesia Post Evaluation    Level of consciousness: awake  Complications: no

## 2021-05-20 ENCOUNTER — OFFICE VISIT (OUTPATIENT)
Dept: BARIATRICS/WEIGHT MGMT | Age: 31
End: 2021-05-20
Payer: MEDICAID

## 2021-05-20 VITALS
HEART RATE: 96 BPM | SYSTOLIC BLOOD PRESSURE: 130 MMHG | DIASTOLIC BLOOD PRESSURE: 83 MMHG | BODY MASS INDEX: 44.08 KG/M2 | OXYGEN SATURATION: 98 % | RESPIRATION RATE: 18 BRPM | HEIGHT: 64 IN | WEIGHT: 258.2 LBS

## 2021-05-20 DIAGNOSIS — G89.29 CHRONIC MIDLINE LOW BACK PAIN WITH RIGHT-SIDED SCIATICA: ICD-10-CM

## 2021-05-20 DIAGNOSIS — K21.9 CHRONIC GERD: ICD-10-CM

## 2021-05-20 DIAGNOSIS — R73.03 PREDIABETES: ICD-10-CM

## 2021-05-20 DIAGNOSIS — M54.41 CHRONIC MIDLINE LOW BACK PAIN WITH RIGHT-SIDED SCIATICA: ICD-10-CM

## 2021-05-20 DIAGNOSIS — I10 ESSENTIAL HYPERTENSION: ICD-10-CM

## 2021-05-20 DIAGNOSIS — E66.01 MORBID OBESITY WITH BMI OF 40.0-44.9, ADULT (HCC): Primary | ICD-10-CM

## 2021-05-20 DIAGNOSIS — E55.9 VITAMIN D DEFICIENCY: ICD-10-CM

## 2021-05-20 PROCEDURE — 1036F TOBACCO NON-USER: CPT | Performed by: SURGERY

## 2021-05-20 PROCEDURE — G8417 CALC BMI ABV UP PARAM F/U: HCPCS | Performed by: SURGERY

## 2021-05-20 PROCEDURE — 99214 OFFICE O/P EST MOD 30 MIN: CPT | Performed by: SURGERY

## 2021-05-20 PROCEDURE — G8427 DOCREV CUR MEDS BY ELIG CLIN: HCPCS | Performed by: SURGERY

## 2021-05-20 NOTE — PROGRESS NOTES
[facility]) Physicians   [employer] [facility] [employer]  June Stafford MD, [address] [facility], [address] [place] [address]    [place]  [address] . Phone: [phone]  Fax: [phone]          Chief Complaint   Patient presents with    Obesity     3rd pre-surg         HPI:     Garrick Toledo is a very pleasant 26 y.o. female with Body mass index is 44.32 kg/m². / Chronic Obesity. Josue Lowe has been struggling for several years now with obesity. Josue Lowe feels the weight is an obstacle to achieve and perform things in daily living as well risk on health. Patient  is very determined to lose weight and be healthy, and is working towards  surgical weight loss to achieve this goal. Pre-operative clearance and work up pending. Working hard to keep good dietary habits as well level of activity. Patient denies any nausea, vomiting, fevers, chills, shortness of breath, chest pain, cough, constipation or difficulty urinating. Pain Assessment   Denies any abdominal pain       Past Medical History:   Diagnosis Date    Asthma     associated with seasonal allergies    Hypertension     Pruritic urticarial papules and plaques of pregnancy      Past Surgical History:   Procedure Laterality Date    TONSILLECTOMY      UPPER GASTROINTESTINAL ENDOSCOPY N/A 4/23/2021    EGD BIOPSY performed by Jossie Lemus MD at [address] [facility] ENDOSCOPY     Family History   Adopted: Yes     Social History     Tobacco Use    Smoking status: Never Smoker    Smokeless tobacco: Never Used   Substance Use Topics    Alcohol use: Yes     Comment: socially      I counseled the patient on the importance of not smoking and risks of ETOH. Allergies   Allergen Reactions    No Known Allergies      Vitals:    05/20/21 1018   BP: 130/83   Pulse: 96   Resp: 18   SpO2: 98%   Weight: 258 lb 3.2 oz (117.1 kg)   Height: 5' 4\" (1.626 m)       Body mass index is 44.32 kg/m².     Lab Results   Component Value Date    WBC 8.0 04/07/2021    RBC 4.78 04/07/2021 mouth daily. Start this medication after you finish the weekly regimen, Disp: 90 tablet, Rfl: 2    montelukast (SINGULAIR) 5 MG chewable tablet, Take 5 mg by mouth nightly, Disp: , Rfl:     amLODIPine (NORVASC) 10 MG tablet, daily , Disp: , Rfl:     albuterol sulfate HFA (PROVENTIL HFA) 108 (90 Base) MCG/ACT inhaler, Inhale 2 puffs into the lungs every 4 hours as needed for Wheezing, Disp: 1 Inhaler, Rfl: 1    Review of Systems - History obtained from the patient  General ROS: negative  Psychological ROS: negative  Ophthalmic ROS: negative  Neurological ROS: negative  ENT ROS: negative  Allergy and Immunology ROS: negative  Hematological and Lymphatic ROS: negative  Endocrine ROS: negative  Breast ROS: negative  Respiratory ROS: negative  Cardiovascular ROS: negative  Gastrointestinal ROS:negative  Genito-Urinary ROS: negative  Musculoskeletal ROS: negative   Skin ROS: negative    Physical Exam   Vitals Reviewed   Constitutional: Patient is oriented to person, place, and time. Patient appears well-developed and well-nourished. Patient is active and cooperative. Non-toxic appearance. No distress. HENT:   Head: Normocephalic and atraumatic. Head is without abrasion and without laceration. Hair is normal.   Right Ear: External ear normal. No lacerations. No drainage, swelling . Left Ear: External ear normal. No lacerations. No drainage, swelling. Nose/Mouth: face mask in place  Eyes: Conjunctivae, EOM and lids are normal. Right eye exhibits no discharge. No foreign body present in the right eye. Left eye exhibits no discharge. No foreign body present in the left eye. No scleral icterus. Neck: Trachea normal and normal range of motion. No JVD present. Pulmonary/Chest: Effort normal. No accessory muscle usage or stridor. No apnea. No respiratory distress. Cardiovascular: Normal rate and no JVD. Abdominal: Normal appearance. Patient exhibits no distension. Abdomen is soft, obese, non tender. Musculoskeletal: Normal range of motion. Patient exhibits no edema. Neurological: Patient is alert and oriented to person, place, and time. Patient has normal strength. GCS eye subscore is 4. GCS verbal subscore is 5. GCS motor subscore is 6. Skin: Skin is warm and dry. No abrasion and no rash noted. Patient is not diaphoretic. No cyanosis or erythema. Psychiatric: Patient has a normal mood and affect. Speech is normal and behavior is normal. Cognition and memory are normal.       A/P    Sobeida Nevilleome is 26 y.o. female, Body mass index is 44.32 kg/m². pre surgery, has lost 6.6 lbs  since last visit. The patient underwent dietary counseling with registered dietician. I have reviewed, discussed and agree with the dietary plan. Patient is trying hard to keep good dietary and behavior modifications. Patient is monitoring portion sizes, food choices and liquid calories. Patient is trying to exercise regularly as much as possible. Obesity as a disease is considered a high risk to patients overall health and should therefore be considered a high risk disease state. Advised the patient that not getting there weight under control, that could increase risk of complications/worsening of those conditions on the long-term. (Goal of weight loss surgery is to alleviate/control some of those co-morbidities)    Now with Covid-19 pandemic, CDC and health authorities does classify obese patients as vulnerable and high risk as well. Which makes weight loss a priority for improvement of their wellbeing and overall health. I encouraged the patient to continue exercise and keeping healthy eating habits. Discussed pre-op labs and work up till now. Also counseled the patient extensively on Surgery.        The visit today, included any number of the following: Bariatric Preoperative work up/protocols, review of labs, imaging, provider notes, outside hospital records, performing examination/evaluation, counseling

## 2021-05-20 NOTE — PROGRESS NOTES
Kate Osorio lost 6.6 lbs over the past month. Is pt eating at least 4 times everyday? yes 3 meals and 2 snacks    Is pt eating a lean protein source with all meals and snacks? yes snacks on portion packs of nuts    Has pt decreased their portions using the plate method? yes she is working to be mindful of portions    Is pt choosing low fat/sugar free options? yes avoiding fried foods but still struggling with sweets cravings    Is pt drinking at least 64 oz of clear liquids everyday? yes about 80oz water only    Has pt stopped drinking carbonation, caffeinated, and sugar sweetened beverages? yes she has    Has pt sampled Unjury and/or Nectar protein? yes tried and tolerated    Participating in intentional exercise?  yes walks while son is at soccer practice    Plan/Recommendations: add variety of protein snacks    Handouts: will email emotional eating support group    Annabel Booth RD, LD

## 2021-06-16 ENCOUNTER — TELEMEDICINE (OUTPATIENT)
Dept: BARIATRICS/WEIGHT MGMT | Age: 31
End: 2021-06-16
Payer: MEDICAID

## 2021-06-16 VITALS — HEIGHT: 64 IN | WEIGHT: 258.2 LBS | BODY MASS INDEX: 44.08 KG/M2

## 2021-06-16 DIAGNOSIS — E66.01 MORBID OBESITY WITH BMI OF 40.0-44.9, ADULT (HCC): Primary | ICD-10-CM

## 2021-06-16 DIAGNOSIS — R73.03 PREDIABETES: ICD-10-CM

## 2021-06-16 DIAGNOSIS — K21.9 CHRONIC GERD: ICD-10-CM

## 2021-06-16 DIAGNOSIS — I10 ESSENTIAL HYPERTENSION: ICD-10-CM

## 2021-06-16 DIAGNOSIS — G89.29 CHRONIC MIDLINE LOW BACK PAIN WITH RIGHT-SIDED SCIATICA: ICD-10-CM

## 2021-06-16 DIAGNOSIS — M54.41 CHRONIC MIDLINE LOW BACK PAIN WITH RIGHT-SIDED SCIATICA: ICD-10-CM

## 2021-06-16 PROCEDURE — G8417 CALC BMI ABV UP PARAM F/U: HCPCS | Performed by: SURGERY

## 2021-06-16 PROCEDURE — 99214 OFFICE O/P EST MOD 30 MIN: CPT | Performed by: SURGERY

## 2021-06-16 PROCEDURE — 1036F TOBACCO NON-USER: CPT | Performed by: SURGERY

## 2021-06-16 PROCEDURE — G8427 DOCREV CUR MEDS BY ELIG CLIN: HCPCS | Performed by: SURGERY

## 2021-06-16 NOTE — PROGRESS NOTES
[facility]) Physicians   [facility]  Bentley Barr MD, [address] [facility], [address] [place] [address]    [facility] [address] . Phone: [phone]  Fax: [phone]        TELEHEALTH EVALUATION -- Audio/Visual (During VGAFY-29 public health emergency)           Chief Complaint   Patient presents with    Weight Management       HPI:       Due to the COVID-19 pandemic restrictions on close contact interactions as recommended by CDC and health authorities, the patient's visit was conducted via telemedicine in lieu of a face to face visit. Patient verbally consented and agreed to proceed. The patient is here through telemedicine for their bariatric surgery presurgical visit for future weight loss. Parrish Walls is a very pleasant 26 y.o. female with Chronic Obesity. Vitals:    06/16/21 1148   Weight: 258 lb 3.2 oz (117.1 kg)   Height: 5' 4\" (1.626 m)     Body mass index is 44.32 kg/m². Dulce Gibbons has been struggling for several years now with obesity. Dulce Gibbons feels the weight is an obstacle to achieve and perform things in daily living as well risk on health. Patient  is very determined to lose weight and be healthy, and is working towards  surgical weight loss to achieve this goal. Pre-operative clearance and work up pending. Working hard to keep good dietary habits as well level of activity. Patient denies any nausea, vomiting, fevers, chills, shortness of breath, chest pain, cough, constipation or difficulty urinating.     Pain Assessment   Denies any abdominal pain       Past Medical History:   Diagnosis Date    Asthma     associated with seasonal allergies    Hypertension     Pruritic urticarial papules and plaques of pregnancy      Past Surgical History:   Procedure Laterality Date    TONSILLECTOMY      UPPER GASTROINTESTINAL ENDOSCOPY N/A 4/23/2021    EGD BIOPSY performed by Alvin Meza MD at [facility] History   Adopted: Yes     Social History Tobacco Use    Smoking status: Never Smoker    Smokeless tobacco: Never Used   Substance Use Topics    Alcohol use: Yes     Comment: socially      I counseled the patient on the importance of not smoking and risks of ETOH.    Allergies   Allergen Reactions    No Known Allergies          Lab Results   Component Value Date    WBC 8.0 04/07/2021    RBC 4.78 04/07/2021    HGB 13.7 04/07/2021    HCT 40.8 04/07/2021    MCV 85.5 04/07/2021    MCH 28.7 04/07/2021    MCHC 33.6 04/07/2021    MPV 7.3 04/07/2021    NEUTOPHILPCT 59.3 04/07/2021    LYMPHOPCT 23.8 04/07/2021    MONOPCT 8.2 04/07/2021    EOSRELPCT 8.0 04/07/2021    BASOPCT 0.7 04/07/2021    NEUTROABS 4.7 04/07/2021    LYMPHSABS 1.9 04/07/2021    MONOSABS 0.7 04/07/2021    EOSABS 0.6 04/07/2021     Lab Results   Component Value Date     04/07/2021    K 3.7 04/07/2021     04/07/2021    CO2 24 04/07/2021    ANIONGAP 12 04/07/2021    GLUCOSE 72 04/07/2021    BUN 11 04/07/2021    CREATININE 0.8 04/07/2021    LABGLOM >60 04/07/2021    GFRAA >60 04/07/2021    GFRAA >60 08/22/2010    CALCIUM 8.8 04/07/2021    PROT 7.1 04/07/2021    PROT 5.9 08/22/2010    LABALBU 4.3 04/07/2021    AGRATIO 1.5 04/07/2021    BILITOT 0.6 04/07/2021    ALKPHOS 66 04/07/2021    ALT 18 04/07/2021    AST 19 04/07/2021    GLOB 2.8 04/07/2021     Lab Results   Component Value Date    CHOL 158 04/07/2021    TRIG 130 04/07/2021    HDL 42 04/07/2021    LDLCALC 90 04/07/2021    LABVLDL 26 04/07/2021     Lab Results   Component Value Date    TSHREFLEX 1.59 04/07/2021     Lab Results   Component Value Date    IRON 115 04/07/2021    TIBC 272 04/07/2021    LABIRON 42 04/07/2021     Lab Results   Component Value Date    XJSNRICU24 1173 04/07/2021    FOLATE 17.52 04/07/2021     Lab Results   Component Value Date    VITD25 18.0 04/07/2021     Lab Results   Component Value Date    LABA1C 5.7 04/07/2021    .9 04/07/2021         Current Outpatient Medications:     budesonide-formoterol (SYMBICORT) 160-4.5 MCG/ACT AERO, Inhale 2 puffs into the lungs daily, Disp: , Rfl:     famotidine (PEPCID) 20 MG tablet, Take 1 tablet by mouth daily, Disp: 60 tablet, Rfl: 3    vitamin D (CHOLECALCIFEROL) 60528 UNIT CAPS, Take 1 capsule by mouth once a week, Disp: 12 capsule, Rfl: 0    Cholecalciferol 50 MCG (2000 UT) TABS, Take 1 tablet by mouth daily Take 1 tablet by mouth daily. Start this medication after you finish the weekly regimen, Disp: 90 tablet, Rfl: 2    montelukast (SINGULAIR) 5 MG chewable tablet, Take 5 mg by mouth nightly, Disp: , Rfl:     amLODIPine (NORVASC) 10 MG tablet, daily , Disp: , Rfl:     albuterol sulfate HFA (PROVENTIL HFA) 108 (90 Base) MCG/ACT inhaler, Inhale 2 puffs into the lungs every 4 hours as needed for Wheezing, Disp: 1 Inhaler, Rfl: 1    Review of Systems - History obtained from the patient  General ROS: negative  Psychological ROS: negative  Ophthalmic ROS: negative  Neurological ROS: negative  ENT ROS: negative  Allergy and Immunology ROS: negative  Hematological and Lymphatic ROS: negative  Endocrine ROS: negative  Breast ROS: negative  Respiratory ROS: negative  Cardiovascular ROS: negative  Gastrointestinal ROS:negative  Genito-Urinary ROS: negative  Musculoskeletal ROS: negative   Skin ROS: negative       Physical Exam  Deferred due to pandemic       A/P    Yanique Chingard is 26 y.o. female, Body mass index is 44.32 kg/m². pre surgery, has stable weight since last visit. The patient underwent dietary counseling with myself / or registered dietitian. I have reviewed, discussed and agree with the dietary plan. Patient is trying hard to keep good dietary and behavior modifications. Patient is monitoring portion sizes, food choices and liquid calories. Patient is trying to exercise regularly as much as possible.   We discussed how her weight affects her overall health including:  Patient Active Problem List   Diagnosis     (spontaneous vaginal delivery)    Post term pregnancy at 39 weeks gestation    Essential hypertension    Morbid obesity with BMI of 45.0-49.9, adult (HCC)    Chronic GERD    Chronic midline low back pain with right-sided sciatica    Prediabetes    Vitamin D deficiency    Morbid obesity with BMI of 40.0-44.9, adult ([address]    and importance of weight loss to alleviate those co morbid conditions. I encouraged the patient to continue exercise and keeping healthy eating habits. Discussed pre-op labs and work up till now. Also counseled the patient extensively on Surgery. Obesity as a disease is considered a high risk to patients overall health and should therefore be considered a high risk disease state. Today's visit included any number of the following: Bariatric Pre/Post operative work up/protocols, review of labs, imaging, provider notes, outside hospital records, performing examination/evaluation, counseling patient and/or family, ordering medications/tests, placing referrals and communication with referring physicians, coordination of care; discussing dietary plan/recall with the patient as well with registered dietitian and documentation in the EHR. Of note, the above was done during same day of the actual patient encounter. Maryann Ward is here for her fourth presurgical visit. Patient weight has been stable. Patient is working diligently on getting her preoperative clearances. Discussed with the patient that she needs her PCP letter of medical necessity, weight history as well to sign up for the support group. Patient did miss her appointment with our behaviorist however she is going to go ahead and reschedule. Also patient has her psych evaluation on June 29. We will see the patient next month for continued follow-up. Morbid Obesity: Body mass index is 44.32 kg/m². [x] Continue to make dietary and lifestyle modifications. [x] Plan for Future laparoscopic sleeve gastrectomy.   [x] Return for follow-up next month. Chronic GERD:   [x] Continue to make dietary and lifestyle modifications. [x] Continue PPI or H2 Blocker. Essential Hypertension:  [x] Continue to make dietary and lifestyle modifications. [x] Reviewed the importance of checking blood pressure. [x] Continue to follow up with their PCP for medication management and monitoring. Prediabetes:   [x] Continue to make dietary and lifestyle modifications. [x] Reviewed the importance of checking blood sugars. [x] Continue to follow up with their PCP for medication management and monitoring. Chronic midline low back pain with right-sided sciatica:  [x] Continue to make dietary and lifestyle modifications. [x] Continue with weight loss. Patient advised that its their responsibility to follow up for their care, studies, referrals and/or labs ordered today. Pursuant to the emergency declaration under the [facility], [facility] waiver authority and the [employer] Act, this Virtual Visit was conducted, with patient's consent, to reduce the patient's risk of exposure to COVID-19 and provide continuity of care for an established patient. Services were provided through a video synchronous discussion virtually to substitute for in-person clinic visit. Please note that some or all of this report was generated using voice recognition software. Please notify me in case of any questions about the content of this document, as some errors in transcription may have occurred .

## 2021-06-16 NOTE — PROGRESS NOTES
Kate Osorio stable / unchanged over the past month. Pt concerned with how she will handle emotional/stress eating post op and open to ideas on how to manage this. Due to the COVID-19 restrictions on close contact interactions the patient's visit was conducted via telephone in yasmeen of a face to face visit. The patient is here through telemedicine for their pre surg visit. Is pt eating at least 4 times everyday? 3-4 x day    Is pt eating a lean protein source with all meals and snacks? Breakfast - protein shake  Lunch - tuna salad OR chicken salad with peppers/wrap/crackers   Snack - sometimes - CC OR portioned out nuts OR string cheese OR peppers   Dinner - taco salad made with ground turkey/lean beef/black beans/ 1/2 avocado     Has pt decreased their portions using the plate method? Yes     Is pt choosing low fat/sugar free options? Yes    Is pt drinking at least 64 oz of clear liquids everyday? Yes - 80 oz/day     Has pt stopped drinking carbonation, caffeinated, and sugar sweetened beverages? Yes - Just drinking water, propel     Has pt sampled Unjury and/or Nectar protein? Yes    Participating in intentional exercise?  Walking in the evening with her kids     Plan/Recommendations:   Attend SG - signed pt up for [employer]acking [employer] [facility]  Meal plan and pack snacks - take lunch box   Include protein with all meals/snacks   Continue with exercise  Encouraged pt to attend support groups, meet with behaviorist if needed, plan/organize/have healthy foods on hand for long term success post op    Handouts: none     Luís Miguel, ZACKARY, LD

## 2021-06-23 ENCOUNTER — TELEMEDICINE (OUTPATIENT)
Dept: BARIATRICS/WEIGHT MGMT | Age: 31
End: 2021-06-23
Payer: MEDICAID

## 2021-06-23 DIAGNOSIS — Z71.89 INDIVIDUAL COUNSELING ENCOUNTER: Primary | ICD-10-CM

## 2021-06-23 PROCEDURE — 96156 HLTH BHV ASSMT/REASSESSMENT: CPT | Performed by: SOCIAL WORKER

## 2021-06-23 NOTE — PROGRESS NOTES
Cesar Buck is a 26 y.o. female evaluated via video virtual visit on 6/23/2021. Cesar Buck presents today with diagnosis of individual counseling encounter related to behavioral health concerns. Patient is presenting in their home for initial assessment. Provider is evaluating virtually in home. Patient presented as open and honest throughout virtual appointment. Presenting Problem:   \"I've had ups and I've had downs. I am a huge emotional and stress eater\"     Home life / Family relationships / Supports:   Patient lives by herself with her 2 children (10 and 3). States that her family (minus her mother) are very supportive. Hobbies/Positives:   Enjoys being outside with her kids, binge watching shows, tries to be active so she does not fall into unhealthy habits of snacking while bored. Employment hx:  Patient states that she lost her job due to covid. 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 Psychiatric hx:  \"I would say no, but sometimes I wonder if I had a mild case of depression when I lost my job\"     Hx of emotional/stress/bored eating:  See above     Daily Health Concerns/Limitations:  n/a    Substance Use:  n/a    Current needs / Limitations   Emotional / stress eating  Not having enough confidence in the past to reach her goals  Struggling with will power at times    Additional Questions/Concerns per patient  n/a    Behaviorist overview:   Discussed and created goals with patient. Patient was very open and excited to discuss plans to improve progress. Will provide patient with handouts on emotional eating, stress management, goal setting and mindful eating.      Goals      Make Healther Lifestyle choices      Utilize mindful eating  Reduce emotional eating  Monitor stress levels             Consent:  She and/or health care decision maker is aware that that she may receive a bill for this video service, depending on her insurance coverage, and has provided verbal consent to proceed: Yes      I affirm this is a Patient Initiated Episode with an Established Patient who has not had a related appointment within my department in the past 7 days or scheduled within the next 24 hours.     Total Time: minutes: 11-20 minutes    Note: not billable if this call serves to triage the patient into an appointment for the relevant concern      Raffaele Wright, [address] ,[place]

## 2021-07-13 ENCOUNTER — OFFICE VISIT (OUTPATIENT)
Dept: BARIATRICS/WEIGHT MGMT | Age: 31
End: 2021-07-13
Payer: MEDICAID

## 2021-07-13 VITALS
RESPIRATION RATE: 16 BRPM | OXYGEN SATURATION: 100 % | HEART RATE: 76 BPM | DIASTOLIC BLOOD PRESSURE: 90 MMHG | WEIGHT: 263 LBS | SYSTOLIC BLOOD PRESSURE: 140 MMHG | BODY MASS INDEX: 44.9 KG/M2 | HEIGHT: 64 IN

## 2021-07-13 DIAGNOSIS — E66.01 MORBID OBESITY WITH BMI OF 45.0-49.9, ADULT (HCC): Primary | ICD-10-CM

## 2021-07-13 DIAGNOSIS — K21.9 CHRONIC GERD: ICD-10-CM

## 2021-07-13 DIAGNOSIS — I10 ESSENTIAL HYPERTENSION: ICD-10-CM

## 2021-07-13 DIAGNOSIS — R73.03 PREDIABETES: ICD-10-CM

## 2021-07-13 DIAGNOSIS — M54.41 CHRONIC MIDLINE LOW BACK PAIN WITH RIGHT-SIDED SCIATICA: ICD-10-CM

## 2021-07-13 DIAGNOSIS — G89.29 CHRONIC MIDLINE LOW BACK PAIN WITH RIGHT-SIDED SCIATICA: ICD-10-CM

## 2021-07-13 PROCEDURE — 99214 OFFICE O/P EST MOD 30 MIN: CPT | Performed by: SURGERY

## 2021-07-13 PROCEDURE — G8417 CALC BMI ABV UP PARAM F/U: HCPCS | Performed by: SURGERY

## 2021-07-13 PROCEDURE — 1036F TOBACCO NON-USER: CPT | Performed by: SURGERY

## 2021-07-13 PROCEDURE — G8427 DOCREV CUR MEDS BY ELIG CLIN: HCPCS | Performed by: SURGERY

## 2021-07-13 NOTE — PROGRESS NOTES
Kate Osorio gained 4.8 lbs over the past month. Pt is struggling with comfort eating, stress eating; she is busy with school and studying late at night. Breakfast: protein shake or hb egg    Snack: none    Lunch: tuna salad with crax OR bell pepper sandwich, cottage cheese OR string cheese    Snack: string cheese or nuts    Dinner: chix, broccoli OR fish, veggies    Snack: nuts or pb or string cheese    Is pt consuming smaller portions? yes pt is planning, prepping, using smaller containers    Is pt consuming at least 64 oz of fluids per day? yes water and propel zero    Is pt consuming carbonated, caffeinated, or sugary beverages? no    Has pt sampled Unjury and/or Nectar protein?  Yes; tried and tolerated    Exercise: walking with kids    Plan/Recommendations: track intake and journal; continue to see 1 Baljit Casey: will email Emotional Eating ERNESTO Cuadra Or, RD, LD

## 2021-07-15 NOTE — PROGRESS NOTES
montelukast (SINGULAIR) 5 MG chewable tablet, Take 5 mg by mouth nightly, Disp: , Rfl:     amLODIPine (NORVASC) 10 MG tablet, daily , Disp: , Rfl:     albuterol sulfate HFA (PROVENTIL HFA) 108 (90 Base) MCG/ACT inhaler, Inhale 2 puffs into the lungs every 4 hours as needed for Wheezing, Disp: 1 Inhaler, Rfl: 1    vitamin D (CHOLECALCIFEROL) 60472 UNIT CAPS, Take 1 capsule by mouth once a week, Disp: 12 capsule, Rfl: 0    Review of Systems - History obtained from the patient  General ROS: negative  Psychological ROS: negative  Ophthalmic ROS: negative  Neurological ROS: negative  ENT ROS: negative  Allergy and Immunology ROS: negative  Hematological and Lymphatic ROS: negative  Endocrine ROS: negative  Breast ROS: negative  Respiratory ROS: negative  Cardiovascular ROS: negative  Gastrointestinal ROS:negative  Genito-Urinary ROS: negative  Musculoskeletal ROS: negative   Skin ROS: negative    Physical Exam   Vitals Reviewed   Constitutional: Patient is oriented to person, place, and time. Patient appears well-developed and well-nourished. Patient is active and cooperative. Non-toxic appearance. No distress. HENT:   Head: Normocephalic and atraumatic. Head is without abrasion and without laceration. Hair is normal.   Right Ear: External ear normal. No lacerations. No drainage, swelling . Left Ear: External ear normal. No lacerations. No drainage, swelling. Nose/Mouth: face mask in place  Eyes: Conjunctivae, EOM and lids are normal. Right eye exhibits no discharge. No foreign body present in the right eye. Left eye exhibits no discharge. No foreign body present in the left eye. No scleral icterus. Neck: Trachea normal and normal range of motion. No JVD present. Pulmonary/Chest: Effort normal. No accessory muscle usage or stridor. No apnea. No respiratory distress. Cardiovascular: Normal rate and no JVD. Abdominal: Normal appearance. Patient exhibits no distension. Abdomen is soft, obese, non tender. Musculoskeletal: Normal range of motion. Patient exhibits no edema. Neurological: Patient is alert and oriented to person, place, and time. Patient has normal strength. GCS eye subscore is 4. GCS verbal subscore is 5. GCS motor subscore is 6. Skin: Skin is warm and dry. No abrasion and no rash noted. Patient is not diaphoretic. No cyanosis or erythema. Psychiatric: Patient has a normal mood and affect. Speech is normal and behavior is normal. Cognition and memory are normal.       A/P    Ish Roberts is 26 y.o. female, Body mass index is 45.14 kg/m². pre surgery, has gained few lbs since last visit. The patient underwent extensive dietary counseling with registered dietician. I have reviewed, discussed and agree with the dietary plan. Patient is trying hard to keep good dietary and behavior modifications. Patient is monitoring portion sizes, food choices and liquid calories. Patient is trying to exercise regularly as much as possible. Obesity as a disease is considered a high risk to patients overall health and should therefore be considered a high risk disease state. Advised the patient that not getting there weight under control, that could increase risk of complications/worsening of those conditions on the long-term. (Goal of weight loss surgery is to alleviate/control some of those co-morbidities)    Now with Covid-19 pandemic, CDC and health authorities does classify obese patients as vulnerable and high risk as well. Which makes weight loss a priority for improvement of their wellbeing and overall health. I encouraged the patient to continue exercise and keeping healthy eating habits. Discussed pre-op labs and work up till now. Also counseled the patient extensively on Surgery.        The visit today, included any number of the following: Bariatric Preoperative work up/protocols, review of labs, imaging, provider notes, outside hospital records, performing examination/evaluation, counseling patient and/or family, ordering medications/tests, placing referrals and communication with referring physicians, coordination of care; discussing dietary plan/recall with the patient as well with registered dietitian and documentation in the EHR. Of note, the above was done during same day of the actual patient encounter. Alverto doss is here for her fifth presurgical visit. Patient is unfortunately struggling with a lot of personal stressful issues that has been hydrating her from focusing on her weight loss and eating healthy. She has been stress eating a lot and making better choices. I think the patient would benefit from seeing Ethan our behaviorist more and continue to work on stress management and stress eating before we can proceed with surgery. We discussed how her excess weight affects her overall health and importance of weight loss, healthy diet and active lifestyle to alleviate those co morbid conditions, otherwise risk deterioration. Morbid Obesity: Body mass index is 45.14 kg/m². [x] Continue to make dietary and lifestyle modifications. [x] Plan for Future laparoscopic sleeve gastrectomy. [x] Return for follow-up next month. Chronic GERD:   [x] Continue to make dietary and lifestyle modifications. [x] Continue Omeprazole. Essential Hypertension:  [x] Continue to make dietary and lifestyle modifications. [x] Reviewed the importance of checking blood pressure. [x] Continue to follow up with their PCP for medication management and monitoring. Prediabetes:   [x] Continue to make dietary and lifestyle modifications. [x] Reviewed the importance of checking blood sugars. [x] Continue to follow up with their PCP for medication management and monitoring. Chronic midline low back pain with right-sided sciatica:  [x] Continue to make dietary and lifestyle modifications. [x] Continue with weight loss.              Patient advised that its their

## 2021-08-04 ENCOUNTER — TELEMEDICINE (OUTPATIENT)
Dept: BARIATRICS/WEIGHT MGMT | Age: 31
End: 2021-08-04
Payer: MEDICAID

## 2021-08-04 DIAGNOSIS — Z71.89 INDIVIDUAL COUNSELING ENCOUNTER: Primary | ICD-10-CM

## 2021-08-04 PROCEDURE — 90832 PSYTX W PT 30 MINUTES: CPT | Performed by: SOCIAL WORKER

## 2021-08-04 NOTE — PROGRESS NOTES
Cheri Jones is a 26 y.o. female evaluated via video virtual visit on 8/4/2021. Patient is reporting from in their home; provider is evaluating from in home. Cheri Jones presents today with diagnosis of individual counseling encounter related to behavioral health concerns. Patient presented as open and honest throughout virtual appointment. Consent:  She and/or health care decision maker is aware that that she may receive a bill for this video service, depending on her insurance coverage, and has provided verbal consent to proceed: Yes      Patient's Current Goals:  Goals      Make Healther Lifestyle choices      Utilize mindful eating  Reduce emotional eating  Monitor stress levels             Updates since last visit:   Trying to get back on track, doing much better in august  Getting better at setting boundaries with ex which is helping reduce stress   Continuing to walk a few times a week     Patient concerns or questions  States that she had a \"rough July\"-gained 2 or 3 lbs   States that she has realized her children's father is her biggest trigger and trying to find ways to cope with this outside of comfort eating      Behaviorist overview:   Encouraged patient to continue working on managing stress and emotional eating. Discussed establishing a healthier sleep routine and ways to set boundaries with others. Encouraged patient to follow up in 4-6 weeks. I affirm this is a Patient Initiated Episode with an Established Patient who has not had a related appointment within my department in the past 7 days or scheduled within the next 24 hours. 21 minutes was spent in virtual visit counseling with patient. Follow up care has been discussed with patient in relation to goals and concerns addressed today.      Note: not billable if this call serves to triage the patient into an appointment for the relevant concern      Shiela Arias LISW

## 2021-08-19 ENCOUNTER — OFFICE VISIT (OUTPATIENT)
Dept: BARIATRICS/WEIGHT MGMT | Age: 31
End: 2021-08-19
Payer: MEDICAID

## 2021-08-19 VITALS
BODY MASS INDEX: 44.56 KG/M2 | HEIGHT: 64 IN | DIASTOLIC BLOOD PRESSURE: 81 MMHG | OXYGEN SATURATION: 98 % | HEART RATE: 85 BPM | RESPIRATION RATE: 18 BRPM | SYSTOLIC BLOOD PRESSURE: 120 MMHG | WEIGHT: 261 LBS

## 2021-08-19 DIAGNOSIS — K21.9 CHRONIC GERD: ICD-10-CM

## 2021-08-19 DIAGNOSIS — E66.01 MORBID OBESITY WITH BMI OF 40.0-44.9, ADULT (HCC): Primary | ICD-10-CM

## 2021-08-19 DIAGNOSIS — M54.41 CHRONIC MIDLINE LOW BACK PAIN WITH RIGHT-SIDED SCIATICA: ICD-10-CM

## 2021-08-19 DIAGNOSIS — I10 ESSENTIAL HYPERTENSION: ICD-10-CM

## 2021-08-19 DIAGNOSIS — G89.29 CHRONIC MIDLINE LOW BACK PAIN WITH RIGHT-SIDED SCIATICA: ICD-10-CM

## 2021-08-19 PROCEDURE — G8417 CALC BMI ABV UP PARAM F/U: HCPCS | Performed by: SURGERY

## 2021-08-19 PROCEDURE — 99214 OFFICE O/P EST MOD 30 MIN: CPT | Performed by: SURGERY

## 2021-08-19 PROCEDURE — 1036F TOBACCO NON-USER: CPT | Performed by: SURGERY

## 2021-08-19 PROCEDURE — G8427 DOCREV CUR MEDS BY ELIG CLIN: HCPCS | Performed by: SURGERY

## 2021-08-19 NOTE — PROGRESS NOTES
Kate Osorio lost 2 lbs over 1 month. Pt has been managing stress eating, increase protein good. Breakfast: Protein shake / Smoothie (spinach, protein powder, fruit, & sometimes greek yogurt)    Snack: none    Lunch: CC + bell peppers / chix salad or tuna salad with bell peppers + string cheese    Snack: sometimes - rice cake with PB / portioned nuts    Dinner: fish + asparagus or veggie medley + rice / turkey taco salad    Snack: sometimes - PB    Is pt consuming smaller portions? Yes - trying to be mindful of portions    Is pt consuming at least 64 oz of fluids per day? Yes - water    Is pt consuming carbonated, caffeinated, or sugary beverages? Eliminated caffeine / carbonation    Has pt sampled Unjury and/or Nectar protein?  Yes - tried and tolerated    Exercise: walking while son @ soccer + gym 1x week (cardio + weights) for ~ 60min    Plan/Recommendations:   - Consistent with protein based snack in evening  - Continue with physical activity  - Continue F/U with Behaviorist    Handouts: none    Quynh Dao RD, LD

## 2021-08-19 NOTE — PROGRESS NOTES
[facility]   [facility]  Consuelo Rome MD, [address] [facility], [address] [place] [address]    [place]  [address] . Phone: [phone]  Fax: [phone]          Chief Complaint   Patient presents with    Obesity     6th pre-surg         HPI:     Mary Monson is a very pleasant 33 y.o. female with Body mass index is 44.8 kg/m². / Chronic Obesity. Wilton Kraus has been struggling for several years now with obesity. Wilton Kraus feels the weight is an obstacle to achieve and perform things in daily living as well risk on health. Patient  is very determined to lose weight and be healthy, and is working towards  surgical weight loss to achieve this goal. Pre-operative clearance and work up pending. Working hard to keep good dietary habits as well level of activity. Patient denies any nausea, vomiting, fevers, chills, shortness of breath, chest pain, cough, constipation or difficulty urinating. Pain Assessment   Denies any abdominal pain       Past Medical History:   Diagnosis Date    Asthma     associated with seasonal allergies    Hypertension     Pruritic urticarial papules and plaques of pregnancy      Past Surgical History:   Procedure Laterality Date    TONSILLECTOMY      UPPER GASTROINTESTINAL ENDOSCOPY N/A 4/23/2021    EGD BIOPSY performed by Scott Murillo MD at [address] [facility]     Family History   Adopted: Yes     Social History     Tobacco Use    Smoking status: Never Smoker    Smokeless tobacco: Never Used   Substance Use Topics    Alcohol use: Yes     Comment: socially      I counseled the patient on the importance of not smoking and risks of ETOH. Allergies   Allergen Reactions    No Known Allergies      Vitals:    08/19/21 1043   BP: 120/81   Pulse: 85   Resp: 18   SpO2: 98%   Weight: 261 lb (118.4 kg)   Height: 5' 4\" (1.626 m)       Body mass index is 44.8 kg/m².     Lab Results   Component Value Date    WBC 8.0 04/07/2021    RBC 4.78 04/07/2021    HGB 13.7 04/07/2021    HCT 40.8 04/07/2021    MCV 85.5 04/07/2021    MCH 28.7 04/07/2021    MCHC 33.6 04/07/2021    MPV 7.3 04/07/2021    NEUTOPHILPCT 59.3 04/07/2021    LYMPHOPCT 23.8 04/07/2021    MONOPCT 8.2 04/07/2021    EOSRELPCT 8.0 04/07/2021    BASOPCT 0.7 04/07/2021    NEUTROABS 4.7 04/07/2021    LYMPHSABS 1.9 04/07/2021    MONOSABS 0.7 04/07/2021    EOSABS 0.6 04/07/2021     Lab Results   Component Value Date     04/07/2021    K 3.7 04/07/2021     04/07/2021    CO2 24 04/07/2021    ANIONGAP 12 04/07/2021    GLUCOSE 72 04/07/2021    BUN 11 04/07/2021    CREATININE 0.8 04/07/2021    LABGLOM >60 04/07/2021    GFRAA >60 04/07/2021    GFRAA >60 08/22/2010    CALCIUM 8.8 04/07/2021    PROT 7.1 04/07/2021    PROT 5.9 08/22/2010    LABALBU 4.3 04/07/2021    AGRATIO 1.5 04/07/2021    BILITOT 0.6 04/07/2021    ALKPHOS 66 04/07/2021    ALT 18 04/07/2021    AST 19 04/07/2021    GLOB 2.8 04/07/2021     Lab Results   Component Value Date    CHOL 158 04/07/2021    TRIG 130 04/07/2021    HDL 42 04/07/2021    LDLCALC 90 04/07/2021    LABVLDL 26 04/07/2021     Lab Results   Component Value Date    TSHREFLEX 1.59 04/07/2021     Lab Results   Component Value Date    IRON 115 04/07/2021    TIBC 272 04/07/2021    LABIRON 42 04/07/2021     Lab Results   Component Value Date    YFVXBFOF63 1173 04/07/2021    FOLATE 17.52 04/07/2021     Lab Results   Component Value Date    VITD25 18.0 04/07/2021     Lab Results   Component Value Date    LABA1C 5.7 04/07/2021    .9 04/07/2021         Current Outpatient Medications:     budesonide-formoterol (SYMBICORT) 160-4.5 MCG/ACT AERO, Inhale 2 puffs into the lungs daily, Disp: , Rfl:     famotidine (PEPCID) 20 MG tablet, Take 1 tablet by mouth daily, Disp: 60 tablet, Rfl: 3    Cholecalciferol 50 MCG (2000 UT) TABS, Take 1 tablet by mouth daily Take 1 tablet by mouth daily.  Start this medication after you finish the weekly regimen, Disp: 90 tablet, Rfl: 2    montelukast (SINGULAIR) 5 MG chewable tablet, Take 5 mg by mouth nightly, Disp: , Rfl:     amLODIPine (NORVASC) 10 MG tablet, daily , Disp: , Rfl:     albuterol sulfate HFA (PROVENTIL HFA) 108 (90 Base) MCG/ACT inhaler, Inhale 2 puffs into the lungs every 4 hours as needed for Wheezing, Disp: 1 Inhaler, Rfl: 1    vitamin D (CHOLECALCIFEROL) 27412 UNIT CAPS, Take 1 capsule by mouth once a week, Disp: 12 capsule, Rfl: 0    Review of Systems - History obtained from the patient  General ROS: negative  Psychological ROS: negative  Ophthalmic ROS: negative  Neurological ROS: negative  ENT ROS: negative  Allergy and Immunology ROS: negative  Hematological and Lymphatic ROS: negative  Endocrine ROS: negative  Breast ROS: negative  Respiratory ROS: negative  Cardiovascular ROS: negative  Gastrointestinal ROS:negative  Genito-Urinary ROS: negative  Musculoskeletal ROS: negative   Skin ROS: negative    Physical Exam   Vitals Reviewed   Constitutional: Patient is oriented to person, place, and time. Patient appears well-developed and well-nourished. Patient is active and cooperative. Non-toxic appearance. No distress. HENT:   Head: Normocephalic and atraumatic. Head is without abrasion and without laceration. Hair is normal.   Right Ear: External ear normal. No lacerations. No drainage, swelling . Left Ear: External ear normal. No lacerations. No drainage, swelling. Nose/Mouth: face mask in place  Eyes: Conjunctivae, EOM and lids are normal. Right eye exhibits no discharge. No foreign body present in the right eye. Left eye exhibits no discharge. No foreign body present in the left eye. No scleral icterus. Neck: Trachea normal and normal range of motion. No JVD present. Pulmonary/Chest: Effort normal. No accessory muscle usage or stridor. No apnea. No respiratory distress. Cardiovascular: Normal rate and no JVD. Abdominal: Normal appearance. Patient exhibits no distension. Abdomen is soft, obese, non tender. Musculoskeletal: Normal range of motion. Patient exhibits no edema. Neurological: Patient is alert and oriented to person, place, and time. Patient has normal strength. GCS eye subscore is 4. GCS verbal subscore is 5. GCS motor subscore is 6. Skin: Skin is warm and dry. No abrasion and no rash noted. Patient is not diaphoretic. No cyanosis or erythema. Psychiatric: Patient has a normal mood and affect. Speech is normal and behavior is normal. Cognition and memory are normal.       A/P    Sissy Ordonez is 33 y.o. female, Body mass index is 44.8 kg/m². pre surgery, has lost 2 lbs since last visit. The patient underwent extensive dietary counseling with registered dietician. I have reviewed, discussed and agree with the dietary plan. Patient is trying hard to keep good dietary and behavior modifications. Patient is monitoring portion sizes, food choices and liquid calories. Patient is trying to exercise regularly as much as possible. Obesity as a disease is considered a high risk to patients overall health and should therefore be considered a high risk disease state. Advised the patient that not getting there weight under control, that could increase risk of complications/worsening of those conditions on the long-term. (Goal of weight loss surgery is to alleviate/control some of those co-morbidities)    Now with Covid-19 pandemic, CDC and health authorities does classify obese patients as vulnerable and high risk as well. Which makes weight loss a priority for improvement of their wellbeing and overall health. I encouraged the patient to continue exercise and keeping healthy eating habits. Discussed pre-op labs and work up till now. Also counseled the patient extensively on Surgery.        The visit today, included any number of the following: Bariatric Preoperative work up/protocols, review of labs, imaging, provider notes, outside hospital records, performing examination/evaluation, counseling patient and/or family, ordering medications/tests, placing referrals and communication with referring physicians, coordination of care; discussing dietary plan/recall with the patient as well with registered dietitian and documentation in the EHR. Of note, the above was done during same day of the actual patient encounter. Dian Wakefield is here for her 6 presurgical visit. Discussed the preoperative work-up so far with the patient. Patient has her psych evaluation scheduled for August 31st.  She has seen [facility] our behaviorist and since then has been doing very well managing stress. I am very hopeful by next visit she would be ready to schedule. Preoperative blood work for insurance will be ordered today. (Drug alcohol screening nicotine and pregnancy test)    We discussed how her excess weight affects her overall health and importance of weight loss, healthy diet and active lifestyle to alleviate those co morbid conditions, otherwise risk deterioration. Morbid Obesity: Body mass index is 44.8 kg/m². [x] Continue to make dietary and lifestyle modifications. [x] Plan for Future laparoscopic sleeve gastrectomy. [x] Return for follow-up next month. Chronic GERD:   [x] Continue to make dietary and lifestyle modifications. [x] Continue Famotidine. Essential Hypertension:  [x] Continue to make dietary and lifestyle modifications. [x] Reviewed the importance of checking blood pressure. [x] Continue to follow up with their PCP for medication management and monitoring. Chronic midline low back pain with right-sided sciatica:  [x] Continue to make dietary and lifestyle modifications. [x] Continue with weight loss. Patient advised that its their responsibility to follow up for studies, referrals and/or labs ordered today.

## 2021-08-31 ENCOUNTER — INITIAL CONSULT (OUTPATIENT)
Dept: BARIATRICS/WEIGHT MGMT | Age: 31
End: 2021-08-31
Payer: MEDICAID

## 2021-08-31 DIAGNOSIS — E66.01 MORBID OBESITY WITH BMI OF 40.0-44.9, ADULT (HCC): ICD-10-CM

## 2021-08-31 DIAGNOSIS — F43.22 ADJUSTMENT REACTION WITH ANXIETY: Primary | ICD-10-CM

## 2021-08-31 PROCEDURE — 90791 PSYCH DIAGNOSTIC EVALUATION: CPT | Performed by: PSYCHOLOGIST

## 2021-08-31 NOTE — PROGRESS NOTES
Tanja Sharp presented for her presurgical psychological evaluation on 08/31/2021. The evaluation consisted of a clinical interview, the Eating Habits Checklist, the Binge Eating Disorder Screener - 7 (BEDS-7), and the [place]  (MBMD) administered by the provider. Based on the evaluation, Tanja Sharp is considered to be an appropriate candidate for bariatric surgery from a psychological standpoint. She exhibits mild anxiety secondary to her health and impending surgery. She reports having experienced mild depressive symptoms last year when she lost her job of seven years due to COVID-19, but states she has adjusted to that loss. She reports no additional history of mental health concerns, and no history of psychological intervention, other than several sessions with our behaviorist, [address] [place] [address], for presurgical counseling to address stress eating behaviors. She has never been prescribed psychotropic medication. She denies a history of suicidal ideation or suicide attempts, and has never been hospitalized psychiatrically. There is no indication of chemical abuse or dependence. She is a non-smoker. She reports drinking alcohol socially on occasion, but denies any other recreational or illicit drug use. She denies a history of abuse as a child, but identified two miscarriages as an early adult as traumatic. Tanja Sharp has never been diagnosed with an eating disorder, and her responses in the interview and on the Eating Habits Checklist and the BEDS-7 do not warrant a clinical diagnosis. She acknowledges a history of eating in response to emotional stress and boredom, but reports marked progress in minimizing these behaviors in recent months. She reports a history of skipping regular meals, but states she is currently eating three meals and 2-3 snacks consistently throughout her day. She has eliminated caffeine and carbonated beverages from her diet.  She reports drinking an adequate daily intake of water. She endorsed self-punitive thoughts and feelings related to her weight and/or eating behaviors, and an all-or-nothing cognitive style and behavioral pattern in her dieting history that may serve to heighten her preoccupation with food/eating. She denies binge eating or purging behavior. No Brewer maintains a high level of functional activity as a full-time student pursuing a degree in medical billing/coding online though the [employer]. She is scheduled to graduate in December 2021. 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 She was  from her children's father four years ago, and currently resides with her two children, ages 6and 3years old. Kate KARINA Osorio completed the MBMD as part of the evaluation. Her profile is valid. Results indicate eating and inactivity as possible problem areas at this time. There is no indication of acute psychiatric distress, including anxiety, depression, emotional lability, or cognitive dysfunction. Her profile is characterized by a public posture of assertiveness, and a tendency to maintain a cool distance from others. Her guardedness may be, in large part, related to her own insecurities and the perceived risk of rejection or humiliation by others. Because she is accustomed to feeling strong and unassailable, she may struggle with the vulnerable role of medical patient. Her initial response to illness is likely to be denial, followed by annoyance or irritability to mask the growing anxiety about her health. Providers should adopt a straightforward, businesslike, and consistent approach in order to win her respect and cooperation. Allowing her to participate as much as possible in the planning and implementation of her self-care regimen may also enhance compliance by drawing on her desire for self-mastery and control.  Her profile indicates she may exhibit a strong emotional reaction to significant changes in her medical status. The coping assets reflected in her profile include appropriate concern for her health, functional competence, pain tolerance, social support, and future optimism. Anselmo Mcclendon exhibited good awareness of the risks of bariatric surgery; however, she believes the benefits will outweigh the potential risks, as she hopes to minimize or reverse the effects of several medical concerns, including hypertension, GERD, asthma, chronic back pain with sciatica, and her status as pre-diabetic. She reports realistic expectations for the procedure, as her overall goals include improved health, maintenance of a health lifestyle for herself and her children, and a goal weight of 160 lbs. She understands the need for permanent lifestyle change, including dietary modifications and a regular exercise program, and expressed willingness to implement the necessary changes. She expressed a commitment to comply with treatment recommendations through this office. She identified her family and several close friends, some of whom have already undergone weight loss surgery, as a good support system in her efforts to meet her weight management goals. In summary, Anselmo Mcclendon is considered to be an appropriate candidate for bariatric surgery from a psychological standpoint. She was encouraged to participate in support group activities through [employer], and to consult with our staff should she experience any significant post-surgical mood changes or have difficulty modifying her eating behaviors. Feel free to consult with me as needed with any further questions regarding this evaluation. Patient spent 42 minutes completing the psychological testing. Provider spent 45 minutes in test evaluation services on 08/31/2021, and an additional 15 minutes on 09/02/2021.

## 2021-09-07 ENCOUNTER — HOSPITAL ENCOUNTER (OUTPATIENT)
Age: 31
Discharge: HOME | End: 2021-09-07
Payer: MEDICAID

## 2021-09-07 DIAGNOSIS — I10 ESSENTIAL HYPERTENSION: ICD-10-CM

## 2021-09-07 DIAGNOSIS — M54.41 CHRONIC MIDLINE LOW BACK PAIN WITH RIGHT-SIDED SCIATICA: ICD-10-CM

## 2021-09-07 DIAGNOSIS — E66.01 MORBID OBESITY WITH BMI OF 40.0-44.9, ADULT (HCC): ICD-10-CM

## 2021-09-07 DIAGNOSIS — K21.9 CHRONIC GERD: ICD-10-CM

## 2021-09-07 DIAGNOSIS — G89.29 CHRONIC MIDLINE LOW BACK PAIN WITH RIGHT-SIDED SCIATICA: ICD-10-CM

## 2021-09-07 LAB
AMPHETAMINE SCREEN, URINE: NORMAL
BARBITURATE SCREEN URINE: NORMAL
BENZODIAZEPINE SCREEN, URINE: NORMAL
CANNABINOID SCREEN URINE: NORMAL
COCAINE METABOLITE SCREEN URINE: NORMAL
ETHANOL: NORMAL MG/DL (ref 0–0.08)
HCG(URINE) PREGNANCY TEST: NEGATIVE
Lab: NORMAL
METHADONE SCREEN, URINE: NORMAL
OPIATE SCREEN URINE: NORMAL
OXYCODONE URINE: NORMAL
PH UA: 6
PHENCYCLIDINE SCREEN URINE: NORMAL
PROPOXYPHENE SCREEN: NORMAL

## 2021-09-07 PROCEDURE — 82077 ASSAY SPEC XCP UR&BREATH IA: CPT

## 2021-09-07 PROCEDURE — 84703 CHORIONIC GONADOTROPIN ASSAY: CPT

## 2021-09-07 PROCEDURE — 36415 COLL VENOUS BLD VENIPUNCTURE: CPT

## 2021-09-07 PROCEDURE — G0480 DRUG TEST DEF 1-7 CLASSES: HCPCS

## 2021-09-09 ENCOUNTER — OFFICE VISIT (OUTPATIENT)
Dept: BARIATRICS/WEIGHT MGMT | Age: 31
End: 2021-09-09
Payer: MEDICAID

## 2021-09-09 VITALS
SYSTOLIC BLOOD PRESSURE: 132 MMHG | RESPIRATION RATE: 18 BRPM | HEART RATE: 85 BPM | OXYGEN SATURATION: 99 % | BODY MASS INDEX: 44.05 KG/M2 | DIASTOLIC BLOOD PRESSURE: 84 MMHG | HEIGHT: 64 IN | WEIGHT: 258 LBS

## 2021-09-09 DIAGNOSIS — E66.01 MORBID OBESITY WITH BMI OF 40.0-44.9, ADULT (HCC): Primary | ICD-10-CM

## 2021-09-09 DIAGNOSIS — I10 ESSENTIAL HYPERTENSION: ICD-10-CM

## 2021-09-09 DIAGNOSIS — M54.41 CHRONIC MIDLINE LOW BACK PAIN WITH RIGHT-SIDED SCIATICA: ICD-10-CM

## 2021-09-09 DIAGNOSIS — K21.9 CHRONIC GERD: ICD-10-CM

## 2021-09-09 DIAGNOSIS — R73.03 PREDIABETES: ICD-10-CM

## 2021-09-09 DIAGNOSIS — G89.29 CHRONIC MIDLINE LOW BACK PAIN WITH RIGHT-SIDED SCIATICA: ICD-10-CM

## 2021-09-09 DIAGNOSIS — E55.9 VITAMIN D DEFICIENCY: ICD-10-CM

## 2021-09-09 PROCEDURE — G8427 DOCREV CUR MEDS BY ELIG CLIN: HCPCS | Performed by: SURGERY

## 2021-09-09 PROCEDURE — G8417 CALC BMI ABV UP PARAM F/U: HCPCS | Performed by: SURGERY

## 2021-09-09 PROCEDURE — 1036F TOBACCO NON-USER: CPT | Performed by: SURGERY

## 2021-09-09 PROCEDURE — 99214 OFFICE O/P EST MOD 30 MIN: CPT | Performed by: SURGERY

## 2021-09-09 NOTE — PROGRESS NOTES
Kate Osorio lost 3 lbs over past ~ month. Is pt eating at least 4 times everyday? 5x/day    B- premier OR smoothie: berries/unsweetened almond milk/spinach  S- sometimes nuts  L- chicken salad OR salad w/ CC or grilled chicken  S- string cheese OR CC  D- zoodles w/ meatsauce & asparagus or green beans  S- none    Is pt eating a lean protein source with all meals and snacks? yes    Has pt decreased their portions using the plate method? yes - using small containers to measure    Is pt choosing low fat/sugar free options? yes    Is pt drinking at least 64 oz of clear liquids everyday? yes - water    Has pt stopped drinking carbonation, caffeinated, and sugar sweetened beverages? yes    Has pt sampled Unjury and/or Nectar protein?  yes - tried & tolerated    Participating in intentional exercise? yes - walks 3x/wk for 1.5-2 hours    Plan/Recommendations:   - Continue plan    Handouts: none    Jasmina Mcallister RD, LD

## 2021-09-09 NOTE — PROGRESS NOTES
[facility]) Physicians   [employer] [facility] [employer]  Michael Panda MD, [address] [facility], [address] [place] [id number] . Phone: [phone]  Fax: [phone]        HPI:     Zulma Rodrigues is a very pleasant 33 y.o. female with Body mass index is 44.29 kg/m². , Pre-Surgery for future weight loss. Pre-operative clearance and work up done. Working hard to keep good dietary habits as well level of activity. Patient denies any nausea, vomiting, fevers, chills, shortness of breath, chest pain, cough, constipation or difficulty urinating. Pain Assessment   Denies any abdominal pain       Past Medical History:   Diagnosis Date    Asthma     associated with seasonal allergies    Hypertension     Pruritic urticarial papules and plaques of pregnancy      Past Surgical History:   Procedure Laterality Date    TONSILLECTOMY      UPPER GASTROINTESTINAL ENDOSCOPY N/A 4/23/2021    EGD BIOPSY performed by Eleno Newell MD at [address] ENDOSCOPY     Family History   Adopted: Yes     Social History     Tobacco Use    Smoking status: Never Smoker    Smokeless tobacco: Never Used   Substance Use Topics    Alcohol use: Yes     Comment: socially      I counseled the patient on the importance of not smoking and risks of ETOH. Allergies   Allergen Reactions    No Known Allergies      Vitals:    09/09/21 1002   BP: 132/84   Pulse: 85   Resp: 18   SpO2: 99%   Weight: 258 lb (117 kg)   Height: 5' 4\" (1.626 m)       Body mass index is 44.29 kg/m². Current Outpatient Medications:     budesonide-formoterol (SYMBICORT) 160-4.5 MCG/ACT AERO, Inhale 2 puffs into the lungs daily, Disp: , Rfl:     famotidine (PEPCID) 20 MG tablet, Take 1 tablet by mouth daily, Disp: 60 tablet, Rfl: 3    Cholecalciferol 50 MCG (2000 UT) TABS, Take 1 tablet by mouth daily Take 1 tablet by mouth daily.  Start this medication after you finish the weekly regimen, Disp: 90 tablet, Rfl: 2    montelukast (SINGULAIR) 5 MG chewable tablet, Take 5 mg by mouth nightly, Disp: , Rfl:     amLODIPine (NORVASC) 10 MG tablet, daily , Disp: , Rfl:     albuterol sulfate HFA (PROVENTIL HFA) 108 (90 Base) MCG/ACT inhaler, Inhale 2 puffs into the lungs every 4 hours as needed for Wheezing, Disp: 1 Inhaler, Rfl: 1    vitamin D (CHOLECALCIFEROL) 75220 UNIT CAPS, Take 1 capsule by mouth once a week, Disp: 12 capsule, Rfl: 0      Review of Systems - History obtained from the patient  General ROS: negative  Psychological ROS: negative  Ophthalmic ROS: negative  Neurological ROS: negative  ENT ROS: negative  Allergy and Immunology ROS: negative  Hematological and Lymphatic ROS: negative  Endocrine ROS: negative  Breast ROS: negative  Respiratory ROS: negative  Cardiovascular ROS: negative  Gastrointestinal ROS:negative  Genito-Urinary ROS: negative  Musculoskeletal ROS: negative   Skin ROS: negative    Physical Exam   Vitals Reviewed   Constitutional: Patient is oriented to person, place, and time. Patient appears well-developed and well-nourished. Patient is active and cooperative. Non-toxic appearance. No distress. HENT:   Head: Normocephalic and atraumatic. Head is without abrasion and without laceration. Hair is normal.   Right Ear: External ear normal. No lacerations. No drainage, swelling . Left Ear: External ear normal. No lacerations. No drainage, swelling. Mouth/Nose: Mask in Place   Eyes: Conjunctivae, EOM and lids are normal. Right eye exhibits no discharge. No foreign body present in the right eye. Left eye exhibits no discharge. No foreign body present in the left eye. No scleral icterus. Neck: Trachea normal and normal range of motion. No JVD present. Pulmonary/Chest: Effort normal. No accessory muscle usage or stridor. No apnea. No respiratory distress. Cardiovascular: Normal rate and no JVD. Abdominal: Normal appearance. Patient exhibits no distension. Abdomen is soft, obese, non tender.    Musculoskeletal: Normal range of motion. Patient exhibits no edema. Neurological: Patient is alert and oriented to person, place, and time. Patient has normal strength. GCS eye subscore is 4. GCS verbal subscore is 5. GCS motor subscore is 6. Skin: Skin is warm and dry. No abrasion and no rash noted. Patient is not diaphoretic. No cyanosis or erythema. Psychiatric: Patient has a normal mood and affect. Speech is normal and behavior is normal. Cognition and memory are normal.       A/P       Sunil Holland is 33 y.o. female, Body mass index is 44.29 kg/m². pre surgery. The patient underwent dietary counseling with registered dietician. I have reviewed, discussed and agree with the dietary plan. Patient is trying hard to keep good dietary and behavior modifications. Patient is monitoring portion sizes, food choices and liquid calories. Patient is trying to exercise regularly as much as possible. We discussed how her weight affects her overall health including:  Patient Active Problem List   Diagnosis     (spontaneous vaginal delivery)    Post term pregnancy at 39 weeks gestation    Essential hypertension    Morbid obesity with BMI of 45.0-49.9, adult ([address]    Chronic GERD    Chronic midline low back pain with right-sided sciatica    Prediabetes    Vitamin D deficiency    Morbid obesity with BMI of 40.0-44.9, adult ([address]    and importance of weight loss to alleviate those co morbid conditions. I encouraged the patient to continue exercise and keeping healthy eating habits. Discussed pre-op labs and work up till now. Also counseled the patient extensively on Surgery.       Total encounter time: 30 minutes, including any number of the following: Bariatric Pre/Post operative work up/protocols, review of labs, imaging, provider notes, outside hospital records, performing examination/evaluation, counseling patient and/or family, ordering medications/tests, placing referrals and communication with referring physicians, coordination of care; discussing dietary plan/recall with the patient as well with registered dietitian and documentation in the EHR. Of note, the above was done during same day of the actual patient encounter. Rivera Hanson is a 33 y.o. female with Body mass index is 44.29 kg/m². ,  patient is deemed appropriate candidate for weight loss surgery by our multidisciplinary team.       Following The Metabolic and Bariatric Surgery Accreditation and Quality Improvement Program [facility]), and [employer] (ACS) recommendations, the Bariatric Surgical Risk/Benefit Calculator was used for Rivera Hanson. Report was discussed with Dian Wakefield and patient wishes to proceed with surgery, fully understanding the risks and benefits. Of note, The [facility] Bariatric Surgical Risk/Benefit Calculator estimates the chance of an unfavorable outcome (such as a complication or death), the chance of remission of weight-related comorbidities, and the patient's BMI, weight change, and percent total weight change after surgery. These quantities are estimated based upon information the patient gives the healthcare provider about prior health history. The estimates are calculated using data from a large number of patients who had a primary bariatric surgical procedure similar to the one the patient may have. Please note the risk percentages, remission percentages, BMI, weight change, and percent total weight change provided to you by the risk/benefit calculator are only estimates. These estimates only take certain information into account. There may be other factors that are not included in the estimate which may increase or decrease the risk of a complication, the chance of remission of a weight-related comorbidity, or the amount of weight the patient loses. These estimates are not a guarantee of results.  A complication after surgery may happen even if the risk is low, a weight-related comorbidity may not go into remission even if the chances are high, and a patient may lose more or less weight than predicted. This information is not intended to replace the advice of a doctor or healthcare provider about the diagnosis, treatment, or potential outcomes. The Provider is not responsible for medical decisions that may be made by the patient based on the risk/benefit calculator estimates, since these estimates are provided for informational purposes. Patients should always consult their doctor or other health care provider before deciding on a treatment plan. Both open and laparoscopic approach were explained in details. Benefits and risks explained. Informed consent obtained. Risks including but not limited to; Infection, bleeding, gastric leak or obstruction, persistent nausea, vomiting, or reflux, injury to surrounding structures, risks of anesthesia, stricture, delayed gastric emptying, staple line leak, incisional hernia, malnutrition , hair loss, and/ or Conversion to Open surgery may be necessary. Failure to lose or maintain weight loss, Gallstones or Kidney Stones, Deep Venous Thrombosis , pulmonary embolism and / or death. With obesity and/or Fatty liver , I may elect to perform liver core biopsy to have  Baseline idea on liver pathology if there is abnormal Liver Functions or if there is hepatomegaly &/or lesion, risks include but not limited to bile leak, liver hematoma or failure to diagnose a condition. I did explain thoroughly to the patient that compliance with pre- and post op diet and other recommendations are integral part to improve the chances of successful weight loss and also not following it could end with serious health complications. We discussed that our goal is to ameliorate the medical problems and not to obtain a specific body mass index. Patient understands the risks and benefits and wishes to proceed with the procedure.   Also understands if BMI is lower than 40 without significant co morbid conditions, concerns for risks of surgery being somewhat higher over long run, however patient wants to proceed and fully understands the risks. Clearly BMI over 35 does impose very serious health risks as well chances of losing weight on diet only is very limited and sustaining weight loss is even less, thus surgery is certainly recommended for long term weight loss and better health overall given compliance. Obesity as a disease is considered a high risk to patients overall health and should therefore be considered a high risk disease state. Now with Covid-19 pandemic, CDC and health authorities does classify obese patients as vulnerable and high risk as well. Which makes weight loss a priority for improvement of their wellbeing and overall health. I advised the patient that we can't guarantee final insurance approval.        I advised the patient that sometimes other indicated procedures may arise and the decision will be based on my assessment intraoperatively. Some may include include but not limited to:  [Ventral Hernia, Risks include but not limited to; infection, bleeding, injury to organs or nerves or vessels, PE, DVT, cardiac events, , persistent pain or symptoms, abscess, hernia recurrence or need for further procedures. However that will be determined intra-operatively, if not safe to proceed , then any additional procedures will have to be addressed later as primary goal is bariatric surgery.]      [ Hiatal Hernia, will attempt repair,  risks and benefits including but not limited to; hemothorax, pneumothorax, recurrence, difficulty swallowing, persistent symptoms, reflux and need for medications, esophageal, splenic, lung, heart, bowel, vagus nerve or gastric injuries.  However that will be determined intra-operatively, if not safe to proceed, then any additional procedures will have to be addressed later as primary goal is bariatric surgery.]     Reynold Cotter understands

## 2021-09-10 LAB
3-OH-COTININE: <2 NG/ML
COTININE: <2 NG/ML
NICOTINE: <2 NG/ML

## 2021-09-12 ASSESSMENT — ENCOUNTER SYMPTOMS
RESPIRATORY NEGATIVE: 1
ALLERGIC/IMMUNOLOGIC NEGATIVE: 1
GASTROINTESTINAL NEGATIVE: 1
SHORTNESS OF BREATH: 0
EYES NEGATIVE: 1
COUGH: 0

## 2021-09-12 NOTE — PROGRESS NOTES
[address] [facility]   [employer] [facility] [employer]    Subjective:      Patient ID: Deborah Vásquez is a 33 y.o. female    HPI    The patient is here for their bariatric surgery preoperative education group visit. She has made several attempts at weight loss in the past without success and now has been scheduled to have bariatric surgery on October 11, 2021 for future weight loss. She is working to change her dietary behaviors and lose weight to improve comorbid conditions such as hypertension, prediabetes and GERD. Deborah Vásquez is a very pleasant 33 y.o. female with Body mass index is 45.32 kg/m². Past Medical History:   Diagnosis Date    Asthma     associated with seasonal allergies    Hypertension     Pruritic urticarial papules and plaques of pregnancy      Past Surgical History:   Procedure Laterality Date    TONSILLECTOMY      UPPER GASTROINTESTINAL ENDOSCOPY N/A 4/23/2021    EGD BIOPSY performed by Namita Jaime MD at [address] ENDOSCOPY     Family History   Adopted: Yes     Social History     Tobacco Use    Smoking status: Never Smoker    Smokeless tobacco: Never Used   Substance Use Topics    Alcohol use: Not Currently     Comment: socially      I counseled the patient on the importance of not smoking and risks of ETOH. Allergies   Allergen Reactions    No Known Allergies      Vitals:    09/21/21 1301   Weight: 264 lb (119.7 kg)   Height: 5' 4\" (1.626 m)     Body mass index is 45.32 kg/m². Current Outpatient Medications:     budesonide-formoterol (SYMBICORT) 160-4.5 MCG/ACT AERO, Inhale 2 puffs into the lungs daily, Disp: , Rfl:     famotidine (PEPCID) 20 MG tablet, Take 1 tablet by mouth daily, Disp: 60 tablet, Rfl: 3    vitamin D (CHOLECALCIFEROL) 72954 UNIT CAPS, Take 1 capsule by mouth once a week, Disp: 12 capsule, Rfl: 0    Cholecalciferol 50 MCG (2000 UT) TABS, Take 1 tablet by mouth daily Take 1 tablet by mouth daily.  Start this medication after you finish the weekly regimen, Disp: 90 tablet, Rfl: 2    montelukast (SINGULAIR) 5 MG chewable tablet, Take 5 mg by mouth nightly, Disp: , Rfl:     amLODIPine (NORVASC) 10 MG tablet, daily , Disp: , Rfl:     albuterol sulfate HFA (PROVENTIL HFA) 108 (90 Base) MCG/ACT inhaler, Inhale 2 puffs into the lungs every 4 hours as needed for Wheezing, Disp: 1 Inhaler, Rfl: 1    Lab Results   Component Value Date    WBC 8.0 04/07/2021    RBC 4.78 04/07/2021    HGB 13.7 04/07/2021    HCT 40.8 04/07/2021    MCV 85.5 04/07/2021    MCH 28.7 04/07/2021    MCHC 33.6 04/07/2021    MPV 7.3 04/07/2021    NEUTOPHILPCT 59.3 04/07/2021    LYMPHOPCT 23.8 04/07/2021    MONOPCT 8.2 04/07/2021    EOSRELPCT 8.0 04/07/2021    BASOPCT 0.7 04/07/2021    NEUTROABS 4.7 04/07/2021    LYMPHSABS 1.9 04/07/2021    MONOSABS 0.7 04/07/2021    EOSABS 0.6 04/07/2021     Lab Results   Component Value Date     04/07/2021    K 3.7 04/07/2021     04/07/2021    CO2 24 04/07/2021    ANIONGAP 12 04/07/2021    GLUCOSE 72 04/07/2021    BUN 11 04/07/2021    CREATININE 0.8 04/07/2021    LABGLOM >60 04/07/2021    GFRAA >60 04/07/2021    GFRAA >60 08/22/2010    CALCIUM 8.8 04/07/2021    PROT 7.1 04/07/2021    PROT 5.9 08/22/2010    LABALBU 4.3 04/07/2021    AGRATIO 1.5 04/07/2021    BILITOT 0.6 04/07/2021    ALKPHOS 66 04/07/2021    ALT 18 04/07/2021    AST 19 04/07/2021    GLOB 2.8 04/07/2021     Lab Results   Component Value Date    CHOL 158 04/07/2021    TRIG 130 04/07/2021    HDL 42 04/07/2021    LDLCALC 90 04/07/2021    LABVLDL 26 04/07/2021     Lab Results   Component Value Date    TSHREFLEX 1.59 04/07/2021     Lab Results   Component Value Date    IRON 115 04/07/2021    TIBC 272 04/07/2021    LABIRON 42 04/07/2021     Lab Results   Component Value Date    XOEXEUYM92 1173 04/07/2021    FOLATE 17.52 04/07/2021     Lab Results   Component Value Date    VITD25 18.0 04/07/2021     Lab Results   Component Value Date    LABA1C 5.7 04/07/2021    .9 04/07/2021 Review of Systems   Constitutional: Negative. Negative for chills, fatigue and fever. HENT: Negative. Eyes: Negative. Respiratory: Negative. Negative for cough and shortness of breath. Cardiovascular: Negative. Gastrointestinal: Negative. Endocrine: Negative. Genitourinary: Negative. Musculoskeletal: Negative. Skin: Negative. Allergic/Immunologic: Negative. Neurological: Negative. Hematological: Negative. Psychiatric/Behavioral: Negative. Objective:     Physical Exam  Vitals reviewed. Constitutional:       Appearance: She is well-developed. HENT:      Head: Normocephalic and atraumatic. Eyes:      Conjunctiva/sclera: Conjunctivae normal.      Pupils: Pupils are equal, round, and reactive to light. Pulmonary:      Effort: Pulmonary effort is normal.   Abdominal:      Palpations: Abdomen is soft. Musculoskeletal:         General: Normal range of motion. Cervical back: Normal range of motion and neck supple. Skin:     General: Skin is warm and dry. Neurological:      Mental Status: She is alert and oriented to person, place, and time. Psychiatric:         Behavior: Behavior normal.         Thought Content: Thought content normal.         Judgment: Judgment normal.       Assessment and Plan:   Patient is here for their preoperative education group visit for sleeve gastrectomy. The patient is up 6 lbs today. The patient's current Body mass index is 45.32 kg/m². (9/21/21). She is making good dietary and behavior modifications and is considered to be a good surgical candidate. Patient has a diagnosis of hypertension. Reviewed the importance of checking blood pressure preoperatively and postoperatively. In addition, following up with their PCP for medication adjustments as needed.  Discussed the fact that they will be required to crush or open their medications for the first two weeks after surgery and reviewed those medications that can not be crushed. Patient has a diagnosis of prediabetes. Reviewed the importance of complying with post op diet. Patient has a diagnosis of chronic GERD and takes a H2 Blocker. Discussed the benefits of weight loss and dietary changes on acid reflux. However, they will most likely need to continue their medication short term after surgery as they adjust to the new diet. Discussed the fact that they will be required to crush or open their medications for the first two weeks after surgery and reviewed those medications that can not be crushed. Patient received instruction that it is recommended to avoid pregnancy following bariatric surgery for at least 2 years to allow them to have stable weight loss and to help avoid increased risk of vitamin deficiencies and malnutrition. The patient was encouraged to discuss possible contraceptive methods with their PCP or OBGYN. Patient received instructions from the registered dietitian in reference to the two week preoperative diet and the four phases of their postoperative diet. In addition I reviewed these instructions and stressed the importance of following these recommendations for their safety. Patient completed the preoperative class where they were provided with education related to their bariatric surgery, common surgical complications, medications preoperatively & postoperatively, special concerns related to bariatric surgery postoperatively, vitamin supplementation, patient agreement, PAT & scheduling, hospital course, wellness discovery program and what to do in the case of an emergency postoperatively. The dietitians reviewed all preoperative and postoperative diet instructions. Patient was given the opportunity to ask questions during the group visit and these questions were answered by myself and/or the dietitian.  I spent a total of 40 minutes on the day of the visit and over half of that time was spent counseling the patient on proper dietary behaviors, exercise and surgery protocols.

## 2021-09-13 NOTE — PROGRESS NOTES
Patient Name:   Jennifer Ryan      Type of Surgery:  Sleeve         Date of Surgery:  10/11/21       Start Pre-Op Diet On:  9/28/21       Start Clear Liquids On:  10/10/21       If you are having a gastric bypass, start Bowel Prep between 2-4pm the day prior to surgery. NPO after midnight the night before your surgery! Take morning medications with a small amount of water.     NOTES:_______________________________________  ______________________________________________

## 2021-09-21 ENCOUNTER — TELEPHONE (OUTPATIENT)
Dept: BARIATRICS/WEIGHT MGMT | Age: 31
End: 2021-09-21

## 2021-09-21 ENCOUNTER — OFFICE VISIT (OUTPATIENT)
Dept: BARIATRICS/WEIGHT MGMT | Age: 31
End: 2021-09-21
Payer: MEDICAID

## 2021-09-21 VITALS — WEIGHT: 264 LBS | BODY MASS INDEX: 45.07 KG/M2 | HEIGHT: 64 IN

## 2021-09-21 DIAGNOSIS — E66.01 MORBID OBESITY WITH BMI OF 45.0-49.9, ADULT (HCC): ICD-10-CM

## 2021-09-21 DIAGNOSIS — R73.03 PREDIABETES: ICD-10-CM

## 2021-09-21 DIAGNOSIS — I10 ESSENTIAL HYPERTENSION: Primary | ICD-10-CM

## 2021-09-21 DIAGNOSIS — K21.9 CHRONIC GERD: ICD-10-CM

## 2021-09-21 PROCEDURE — 99214 OFFICE O/P EST MOD 30 MIN: CPT | Performed by: NURSE PRACTITIONER

## 2021-09-21 PROCEDURE — 1036F TOBACCO NON-USER: CPT | Performed by: NURSE PRACTITIONER

## 2021-09-21 PROCEDURE — G8427 DOCREV CUR MEDS BY ELIG CLIN: HCPCS | Performed by: NURSE PRACTITIONER

## 2021-09-21 PROCEDURE — G8417 CALC BMI ABV UP PARAM F/U: HCPCS | Performed by: NURSE PRACTITIONER

## 2021-09-23 RX ORDER — CHOLECALCIFEROL (VITAMIN D3) 25 MCG
TABLET,CHEWABLE ORAL
Status: ON HOLD | COMMUNITY
End: 2021-10-12 | Stop reason: HOSPADM

## 2021-09-23 RX ORDER — ACETAMINOPHEN 160 MG/5ML
650 SOLUTION ORAL ONCE
Status: CANCELLED | OUTPATIENT
Start: 2021-09-23 | End: 2021-09-23

## 2021-09-23 NOTE — PROGRESS NOTES
Name_______________________________________Printed:____________________  Date and time of surgery__10/11  1300______________________Arrival Time:__1100______________   1. The instructions given regarding when and if a patient needs to stop oral intake prior to surgery varies. Follow the specific instructions you were given                  _x_Nothing to eat or to drink after Midnight the night before.                   ____Carbo loading or ERAS instructions will be given to select patients-if you have been given those instructions -please do the following                           The evening before your surgery after dinner before midnight drink 40 ounces of gatorade. If you are diabetic use sugar free. The morning of surgery drink 40 ounces of water. This needs to be finished 3 hours prior to your surgery start time. 2. Take the following pills with a small sip of water on the morning of surgery___inhaler norvasc pepcid________________________________________________                  Do not take blood pressure medications ending in pril or sartan the vinnie prior to surgery or the morning of surgery_   3. Aspirin, Ibuprofen, Advil, Naproxen, Vitamin E and other Anti-inflammatory products and supplements should be stopped for 5 -7days before surgery or as directed by your physician. 4. Check with your Doctor regarding stopping Plavix, Coumadin,Eliquis, Lovenox,Effient,Pradaxa,Xarelto, Fragmin or other blood thinners and follow their instructions. 5. Do not smoke, and do not drink any alcoholic beverages 24 hours prior to surgery. This includes NA Beer. Refrain from the usage of any recreational drugs. 6. You may brush your teeth and gargle the morning of surgery. DO NOT SWALLOW WATER   7. You MUST make arrangements for a responsible adult to stay on site while you are here and take you home after your surgery. You will not be allowed to leave alone or drive yourself home.   It is strongly suggested someone stay with you the first 24 hrs. Your surgery will be cancelled if you do not have a ride home. 8. A parent/legal guardian must accompany a child scheduled for surgery and plan to stay at the hospital until the child is discharged. Please do not bring other children with you. 9. Please wear simple, loose fitting clothing to the hospital.  Sharonda Merck not bring valuables (money, credit cards, checkbooks, etc.) Do not wear any makeup (including no eye makeup) or nail polish on your fingers or toes. 10. DO NOT wear any jewelry or piercings on day of surgery. All body piercing jewelry must be removed. 11. If you have ___dentures, they will be removed before going to the OR; we will provide you a container. If you wear ___contact lenses or ___glasses, they will be removed; please bring a case for them. 12. Please see your family doctor/pediatrician for a history & physical and/or concerning medications. Bring any test results/reports from your physician's office. PCP__________________Phone___________H&P Appt. Date________             13 If you  have a Living Will and Durable Power of  for Healthcare, please bring in a copy. 15. Notify your Surgeon if you develop any illness between now and surgery  time, cough, cold, fever, sore throat, nausea, vomiting, etc.  Please notify your surgeon if you experience dizziness, shortness of breath or blurred vision between now & the time of your surgery             15. DO NOT shave your operative site 96 hours prior to surgery. For face & neck surgery, men may use an electric razor 48 hours prior to surgery. 16. Shower the night before or morning of surgery using an antibacterial soap or as you have been instructed. 17. To provide excellent care visitors will be limited to one in the room at any given time. 18.  Please bring picture ID and insurance card.              19.  Visit our web site for additional information:  [email or web address]              20.During flu season no children under the age of 15 are permitted in the hospital for the safety of all patients. 21. If you take a long acting insulin in the evening only  take half of your usual  dose the night  before your procedure              22. If you use a c-pap please bring DOS if staying overnight,             23.For your convenience [facility] has a pharmacy on site to fill your prescriptions. 24. If you use oxygen and have a portable tank please bring it  with you the DOS             25. Bring a complete list of all your medications with name and dose include any supplements. 26. Other__pcp 9/28  PATs 10/5 or6________________________________________   *Please call pre admission testing if you any further questions   [facility]         [phone]   [place] [id number]. Air  [phone]   [facility]    ___ Covid test to be done 3-5 days prior to scheduled surgery -patient aware they are REQUIRED to bring a copy of the negative result DOS-if they receive a positive result to notify their surgeon         If known - indicate where patient is getting covid test done _[facility] 10/5 or 6__________________________________________________________    ___ Rapid - DOS    ___ Other___________________________________      Terri Bear POLICY(subject to change)    There is a one visitor policy at [facility] for all surgeries and endoscopies. Whether the visitor can stay or will be asked to wait in the car will depend on the current policy and if social distancing can be maintained. The policy is subject to change at any time. Please make sure the visitor has a cell phone that is on,charged and able to accept calls, as this may be the way that the staff communicates with them. Pain management is NO VISITOR policyThe patients ride is expected to remain in the car with a cell phone for communication. If the ride is leaving the hospital grounds please make sure they are back in time for pickup. Have the patient inform the staff on arrival what their rides plans are while the patient is in the facility. At the MAIN there is one visitor allowed. Please note that the visitor policy is subject to change. All above information reviewed with patient in person or by phone. Patient verbalizes understanding. All questions and concerns addressed.                                                                                                  Patient/Rep__per phone/pt__________________                                                                                                                                    PRE OP INSTRUCTIONS

## 2021-09-28 ENCOUNTER — PATIENT MESSAGE (OUTPATIENT)
Dept: BARIATRICS/WEIGHT MGMT | Age: 31
End: 2021-09-28

## 2021-09-28 NOTE — TELEPHONE ENCOUNTER
From: Annette Osorio  To: Sheron Krishnan MD  Sent: 9/28/2021 9:48 AM EDT  Subject: Non-Urgent Medical Question    Good morning, today I started my 2wk pre-op diet for weight loss surgery. What else can I eat besides cream of wheat in the mornings. I don't like it at all.      Thanks

## 2021-10-04 NOTE — TELEPHONE ENCOUNTER
Called pt to f/u, discussed preop diet. Pt is following guidelines. Drinking 40-60oz of fluid, encouraged 64oz.

## 2021-10-05 ENCOUNTER — HOSPITAL ENCOUNTER (OUTPATIENT)
Age: 31
Discharge: HOME | End: 2021-10-05
Payer: MEDICAID

## 2021-10-05 DIAGNOSIS — Z01.818 PREOP TESTING: ICD-10-CM

## 2021-10-05 LAB
A/G RATIO: 1.7 (ref 1.1–2.2)
ABO/RH: NORMAL
ALBUMIN SERPL-MCNC: 4.3 G/DL (ref 3.4–5)
ALP BLD-CCNC: 59 U/L (ref 40–129)
ALT SERPL-CCNC: 18 U/L (ref 10–40)
ANION GAP SERPL CALCULATED.3IONS-SCNC: 13 MMOL/L (ref 3–16)
ANTIBODY SCREEN: NORMAL
AST SERPL-CCNC: 18 U/L (ref 15–37)
BILIRUB SERPL-MCNC: 0.5 MG/DL (ref 0–1)
BUN BLDV-MCNC: 14 MG/DL (ref 7–20)
CALCIUM SERPL-MCNC: 9.4 MG/DL (ref 8.3–10.6)
CHLORIDE BLD-SCNC: 101 MMOL/L (ref 99–110)
CO2: 21 MMOL/L (ref 21–32)
CREAT SERPL-MCNC: 0.8 MG/DL (ref 0.6–1.1)
GFR AFRICAN AMERICAN: >60
GFR NON-AFRICAN AMERICAN: >60
GLOBULIN: 2.6 G/DL
GLUCOSE BLD-MCNC: 95 MG/DL (ref 70–99)
HCT VFR BLD CALC: 41.2 % (ref 36–48)
HEMOGLOBIN: 13.5 G/DL (ref 12–16)
MCH RBC QN AUTO: 28.4 PG (ref 26–34)
MCHC RBC AUTO-ENTMCNC: 32.7 G/DL (ref 31–36)
MCV RBC AUTO: 86.8 FL (ref 80–100)
PDW BLD-RTO: 13.8 % (ref 12.4–15.4)
PLATELET # BLD: 398 K/UL (ref 135–450)
PMV BLD AUTO: 7.4 FL (ref 5–10.5)
POTASSIUM SERPL-SCNC: 4.4 MMOL/L (ref 3.5–5.1)
RBC # BLD: 4.74 M/UL (ref 4–5.2)
SODIUM BLD-SCNC: 135 MMOL/L (ref 136–145)
TOTAL PROTEIN: 6.9 G/DL (ref 6.4–8.2)
WBC # BLD: 8.9 K/UL (ref 4–11)

## 2021-10-05 PROCEDURE — 36415 COLL VENOUS BLD VENIPUNCTURE: CPT

## 2021-10-05 PROCEDURE — 86901 BLOOD TYPING SEROLOGIC RH(D): CPT

## 2021-10-05 PROCEDURE — 80053 COMPREHEN METABOLIC PANEL: CPT

## 2021-10-05 PROCEDURE — U0005 INFEC AGEN DETEC AMPLI PROBE: HCPCS

## 2021-10-05 PROCEDURE — 86850 RBC ANTIBODY SCREEN: CPT

## 2021-10-05 PROCEDURE — 85027 COMPLETE CBC AUTOMATED: CPT

## 2021-10-05 PROCEDURE — 86900 BLOOD TYPING SEROLOGIC ABO: CPT

## 2021-10-05 PROCEDURE — U0003 INFECTIOUS AGENT DETECTION BY NUCLEIC ACID (DNA OR RNA); SEVERE ACUTE RESPIRATORY SYNDROME CORONAVIRUS 2 (SARS-COV-2) (CORONAVIRUS DISEASE [COVID-19]), AMPLIFIED PROBE TECHNIQUE, MAKING USE OF HIGH THROUGHPUT TECHNOLOGIES AS DESCRIBED BY CMS-2020-01-R: HCPCS

## 2021-10-06 LAB — SARS-COV-2: NOT DETECTED

## 2021-10-06 NOTE — PROGRESS NOTES
VM left for patient confirming office had notified of time change on 10/11/21 to 0830 with arrival 0630

## 2021-10-10 ENCOUNTER — ANESTHESIA EVENT (OUTPATIENT)
Dept: OPERATING ROOM | Age: 31
End: 2021-10-10
Payer: MEDICAID

## 2021-10-11 ENCOUNTER — HOSPITAL ENCOUNTER (OUTPATIENT)
Age: 31
Setting detail: OBSERVATION
Discharge: HOME | End: 2021-10-12
Attending: SURGERY | Admitting: SURGERY
Payer: MEDICAID

## 2021-10-11 ENCOUNTER — ANESTHESIA (OUTPATIENT)
Dept: OPERATING ROOM | Age: 31
End: 2021-10-11
Payer: MEDICAID

## 2021-10-11 VITALS
TEMPERATURE: 97.2 F | SYSTOLIC BLOOD PRESSURE: 106 MMHG | DIASTOLIC BLOOD PRESSURE: 57 MMHG | OXYGEN SATURATION: 94 % | RESPIRATION RATE: 11 BRPM

## 2021-10-11 DIAGNOSIS — Z01.818 PREOP TESTING: Primary | ICD-10-CM

## 2021-10-11 DIAGNOSIS — Z98.84 S/P LAPAROSCOPIC SLEEVE GASTRECTOMY: ICD-10-CM

## 2021-10-11 DIAGNOSIS — K43.9 VENTRAL HERNIA WITHOUT OBSTRUCTION OR GANGRENE: ICD-10-CM

## 2021-10-11 LAB
ABO/RH: NORMAL
ANTIBODY SCREEN: NORMAL
GLUCOSE BLD-MCNC: 81 MG/DL (ref 70–99)
HCG(URINE) PREGNANCY TEST: NEGATIVE
PERFORMED ON: NORMAL

## 2021-10-11 PROCEDURE — 36415 COLL VENOUS BLD VENIPUNCTURE: CPT

## 2021-10-11 PROCEDURE — 88307 TISSUE EXAM BY PATHOLOGIST: CPT

## 2021-10-11 PROCEDURE — 2580000003 HC RX 258: Performed by: NURSE ANESTHETIST, CERTIFIED REGISTERED

## 2021-10-11 PROCEDURE — 6370000000 HC RX 637 (ALT 250 FOR IP): Performed by: SURGERY

## 2021-10-11 PROCEDURE — 7100000000 HC PACU RECOVERY - FIRST 15 MIN: Performed by: SURGERY

## 2021-10-11 PROCEDURE — 2580000003 HC RX 258: Performed by: ANESTHESIOLOGY

## 2021-10-11 PROCEDURE — 2580000003 HC RX 258: Performed by: SURGERY

## 2021-10-11 PROCEDURE — 3700000000 HC ANESTHESIA ATTENDED CARE: Performed by: SURGERY

## 2021-10-11 PROCEDURE — 3700000001 HC ADD 15 MINUTES (ANESTHESIA): Performed by: SURGERY

## 2021-10-11 PROCEDURE — 43775 LAP SLEEVE GASTRECTOMY: CPT | Performed by: SURGERY

## 2021-10-11 PROCEDURE — 6360000002 HC RX W HCPCS: Performed by: NURSE ANESTHETIST, CERTIFIED REGISTERED

## 2021-10-11 PROCEDURE — G0378 HOSPITAL OBSERVATION PER HR: HCPCS

## 2021-10-11 PROCEDURE — 2500000003 HC RX 250 WO HCPCS: Performed by: NURSE ANESTHETIST, CERTIFIED REGISTERED

## 2021-10-11 PROCEDURE — 2709999900 HC NON-CHARGEABLE SUPPLY: Performed by: SURGERY

## 2021-10-11 PROCEDURE — 6360000002 HC RX W HCPCS: Performed by: SURGERY

## 2021-10-11 PROCEDURE — 6360000002 HC RX W HCPCS

## 2021-10-11 PROCEDURE — 86850 RBC ANTIBODY SCREEN: CPT

## 2021-10-11 PROCEDURE — 2720000010 HC SURG SUPPLY STERILE: Performed by: SURGERY

## 2021-10-11 PROCEDURE — 6370000000 HC RX 637 (ALT 250 FOR IP)

## 2021-10-11 PROCEDURE — 49652 PR LAP, VENTRAL HERNIA REPAIR,REDUCIBLE: CPT | Performed by: SURGERY

## 2021-10-11 PROCEDURE — 86901 BLOOD TYPING SEROLOGIC RH(D): CPT

## 2021-10-11 PROCEDURE — 7100000001 HC PACU RECOVERY - ADDTL 15 MIN: Performed by: SURGERY

## 2021-10-11 PROCEDURE — 94761 N-INVAS EAR/PLS OXIMETRY MLT: CPT

## 2021-10-11 PROCEDURE — 2500000003 HC RX 250 WO HCPCS: Performed by: SURGERY

## 2021-10-11 PROCEDURE — 84703 CHORIONIC GONADOTROPIN ASSAY: CPT

## 2021-10-11 PROCEDURE — 3600000015 HC SURGERY LEVEL 5 ADDTL 15MIN: Performed by: SURGERY

## 2021-10-11 PROCEDURE — C9113 INJ PANTOPRAZOLE SODIUM, VIA: HCPCS | Performed by: SURGERY

## 2021-10-11 PROCEDURE — 3600000005 HC SURGERY LEVEL 5 BASE: Performed by: SURGERY

## 2021-10-11 PROCEDURE — 86900 BLOOD TYPING SEROLOGIC ABO: CPT

## 2021-10-11 RX ORDER — LIDOCAINE HYDROCHLORIDE 20 MG/ML
INJECTION, SOLUTION EPIDURAL; INFILTRATION; INTRACAUDAL; PERINEURAL PRN
Status: DISCONTINUED | OUTPATIENT
Start: 2021-10-11 | End: 2021-10-11 | Stop reason: SDUPTHER

## 2021-10-11 RX ORDER — DIPHENHYDRAMINE HYDROCHLORIDE 50 MG/ML
12.5 INJECTION INTRAMUSCULAR; INTRAVENOUS EVERY 6 HOURS PRN
Status: DISCONTINUED | OUTPATIENT
Start: 2021-10-11 | End: 2021-10-12 | Stop reason: HOSPADM

## 2021-10-11 RX ORDER — MAGNESIUM SULFATE HEPTAHYDRATE 500 MG/ML
INJECTION, SOLUTION INTRAMUSCULAR; INTRAVENOUS PRN
Status: DISCONTINUED | OUTPATIENT
Start: 2021-10-11 | End: 2021-10-11 | Stop reason: SDUPTHER

## 2021-10-11 RX ORDER — HYDROMORPHONE HYDROCHLORIDE 1 MG/ML
0.5 INJECTION, SOLUTION INTRAMUSCULAR; INTRAVENOUS; SUBCUTANEOUS
Status: DISCONTINUED | OUTPATIENT
Start: 2021-10-11 | End: 2021-10-12 | Stop reason: HOSPADM

## 2021-10-11 RX ORDER — PROMETHAZINE HYDROCHLORIDE 25 MG/ML
6.25 INJECTION, SOLUTION INTRAMUSCULAR; INTRAVENOUS EVERY 6 HOURS PRN
Status: DISCONTINUED | OUTPATIENT
Start: 2021-10-11 | End: 2021-10-12 | Stop reason: HOSPADM

## 2021-10-11 RX ORDER — LIDOCAINE HYDROCHLORIDE 10 MG/ML
1 INJECTION, SOLUTION EPIDURAL; INFILTRATION; INTRACAUDAL; PERINEURAL
Status: ACTIVE | OUTPATIENT
Start: 2021-10-11 | End: 2021-10-11

## 2021-10-11 RX ORDER — ROCURONIUM BROMIDE 10 MG/ML
INJECTION, SOLUTION INTRAVENOUS PRN
Status: DISCONTINUED | OUTPATIENT
Start: 2021-10-11 | End: 2021-10-11 | Stop reason: SDUPTHER

## 2021-10-11 RX ORDER — SUCCINYLCHOLINE/SOD CL,ISO/PF 200MG/10ML
SYRINGE (ML) INTRAVENOUS PRN
Status: DISCONTINUED | OUTPATIENT
Start: 2021-10-11 | End: 2021-10-11 | Stop reason: SDUPTHER

## 2021-10-11 RX ORDER — LABETALOL HYDROCHLORIDE 5 MG/ML
10 INJECTION, SOLUTION INTRAVENOUS
Status: DISCONTINUED | OUTPATIENT
Start: 2021-10-11 | End: 2021-10-12 | Stop reason: HOSPADM

## 2021-10-11 RX ORDER — LIDOCAINE HYDROCHLORIDE 10 MG/ML
0.5 INJECTION, SOLUTION EPIDURAL; INFILTRATION; INTRACAUDAL; PERINEURAL ONCE
Status: DISCONTINUED | OUTPATIENT
Start: 2021-10-11 | End: 2021-10-11 | Stop reason: HOSPADM

## 2021-10-11 RX ORDER — SODIUM CHLORIDE, SODIUM LACTATE, POTASSIUM CHLORIDE, CALCIUM CHLORIDE 600; 310; 30; 20 MG/100ML; MG/100ML; MG/100ML; MG/100ML
INJECTION, SOLUTION INTRAVENOUS CONTINUOUS
Status: DISCONTINUED | OUTPATIENT
Start: 2021-10-11 | End: 2021-10-12 | Stop reason: HOSPADM

## 2021-10-11 RX ORDER — DEXAMETHASONE SODIUM PHOSPHATE 4 MG/ML
INJECTION, SOLUTION INTRA-ARTICULAR; INTRALESIONAL; INTRAMUSCULAR; INTRAVENOUS; SOFT TISSUE PRN
Status: DISCONTINUED | OUTPATIENT
Start: 2021-10-11 | End: 2021-10-11 | Stop reason: SDUPTHER

## 2021-10-11 RX ORDER — ALBUTEROL SULFATE 90 UG/1
2 AEROSOL, METERED RESPIRATORY (INHALATION) EVERY 6 HOURS PRN
Status: DISCONTINUED | OUTPATIENT
Start: 2021-10-11 | End: 2021-10-12 | Stop reason: HOSPADM

## 2021-10-11 RX ORDER — PANTOPRAZOLE SODIUM 40 MG/10ML
40 INJECTION, POWDER, LYOPHILIZED, FOR SOLUTION INTRAVENOUS DAILY
Status: DISCONTINUED | OUTPATIENT
Start: 2021-10-11 | End: 2021-10-12 | Stop reason: HOSPADM

## 2021-10-11 RX ORDER — NALOXONE HYDROCHLORIDE 0.4 MG/ML
0.4 INJECTION, SOLUTION INTRAMUSCULAR; INTRAVENOUS; SUBCUTANEOUS PRN
Status: DISCONTINUED | OUTPATIENT
Start: 2021-10-11 | End: 2021-10-12

## 2021-10-11 RX ORDER — SODIUM CHLORIDE 9 MG/ML
10 INJECTION INTRAVENOUS DAILY
Status: DISCONTINUED | OUTPATIENT
Start: 2021-10-11 | End: 2021-10-11

## 2021-10-11 RX ORDER — SODIUM CHLORIDE 9 MG/ML
25 INJECTION, SOLUTION INTRAVENOUS PRN
Status: DISCONTINUED | OUTPATIENT
Start: 2021-10-11 | End: 2021-10-12 | Stop reason: HOSPADM

## 2021-10-11 RX ORDER — PROPOFOL 10 MG/ML
INJECTION, EMULSION INTRAVENOUS PRN
Status: DISCONTINUED | OUTPATIENT
Start: 2021-10-11 | End: 2021-10-11 | Stop reason: SDUPTHER

## 2021-10-11 RX ORDER — FENTANYL CITRATE 50 UG/ML
INJECTION, SOLUTION INTRAMUSCULAR; INTRAVENOUS PRN
Status: DISCONTINUED | OUTPATIENT
Start: 2021-10-11 | End: 2021-10-11 | Stop reason: SDUPTHER

## 2021-10-11 RX ORDER — HYDRALAZINE HYDROCHLORIDE 20 MG/ML
10 INJECTION INTRAMUSCULAR; INTRAVENOUS
Status: DISCONTINUED | OUTPATIENT
Start: 2021-10-11 | End: 2021-10-12 | Stop reason: HOSPADM

## 2021-10-11 RX ORDER — HYDROMORPHONE HCL 110MG/55ML
PATIENT CONTROLLED ANALGESIA SYRINGE INTRAVENOUS PRN
Status: DISCONTINUED | OUTPATIENT
Start: 2021-10-11 | End: 2021-10-11 | Stop reason: SDUPTHER

## 2021-10-11 RX ORDER — ALBUTEROL SULFATE 90 UG/1
AEROSOL, METERED RESPIRATORY (INHALATION)
Status: COMPLETED
Start: 2021-10-11 | End: 2021-10-11

## 2021-10-11 RX ORDER — SODIUM CHLORIDE, SODIUM LACTATE, POTASSIUM CHLORIDE, CALCIUM CHLORIDE 600; 310; 30; 20 MG/100ML; MG/100ML; MG/100ML; MG/100ML
INJECTION, SOLUTION INTRAVENOUS CONTINUOUS
Status: DISCONTINUED | OUTPATIENT
Start: 2021-10-11 | End: 2021-10-11

## 2021-10-11 RX ORDER — PANTOPRAZOLE SODIUM 40 MG/10ML
40 INJECTION, POWDER, LYOPHILIZED, FOR SOLUTION INTRAVENOUS DAILY
Status: DISCONTINUED | OUTPATIENT
Start: 2021-10-11 | End: 2021-10-11

## 2021-10-11 RX ORDER — ALBUTEROL SULFATE 90 UG/1
2 AEROSOL, METERED RESPIRATORY (INHALATION) ONCE
Status: COMPLETED | OUTPATIENT
Start: 2021-10-11 | End: 2021-10-11

## 2021-10-11 RX ORDER — SCOLOPAMINE TRANSDERMAL SYSTEM 1 MG/1
1 PATCH, EXTENDED RELEASE TRANSDERMAL
Status: DISCONTINUED | OUTPATIENT
Start: 2021-10-11 | End: 2021-10-11

## 2021-10-11 RX ORDER — SCOLOPAMINE TRANSDERMAL SYSTEM 1 MG/1
1 PATCH, EXTENDED RELEASE TRANSDERMAL
Status: DISCONTINUED | OUTPATIENT
Start: 2021-10-14 | End: 2021-10-12 | Stop reason: HOSPADM

## 2021-10-11 RX ORDER — MAGNESIUM HYDROXIDE 1200 MG/15ML
LIQUID ORAL CONTINUOUS PRN
Status: COMPLETED | OUTPATIENT
Start: 2021-10-11 | End: 2021-10-11

## 2021-10-11 RX ORDER — ONDANSETRON 2 MG/ML
INJECTION INTRAMUSCULAR; INTRAVENOUS PRN
Status: DISCONTINUED | OUTPATIENT
Start: 2021-10-11 | End: 2021-10-11 | Stop reason: SDUPTHER

## 2021-10-11 RX ORDER — ONDANSETRON 2 MG/ML
4 INJECTION INTRAMUSCULAR; INTRAVENOUS EVERY 6 HOURS PRN
Status: DISCONTINUED | OUTPATIENT
Start: 2021-10-11 | End: 2021-10-12 | Stop reason: HOSPADM

## 2021-10-11 RX ORDER — METHYLENE BLUE 10 MG/ML
INJECTION INTRAVENOUS
Status: COMPLETED | OUTPATIENT
Start: 2021-10-11 | End: 2021-10-11

## 2021-10-11 RX ORDER — SODIUM CHLORIDE, SODIUM LACTATE, POTASSIUM CHLORIDE, CALCIUM CHLORIDE 600; 310; 30; 20 MG/100ML; MG/100ML; MG/100ML; MG/100ML
INJECTION, SOLUTION INTRAVENOUS CONTINUOUS PRN
Status: DISCONTINUED | OUTPATIENT
Start: 2021-10-11 | End: 2021-10-11 | Stop reason: SDUPTHER

## 2021-10-11 RX ORDER — METOCLOPRAMIDE HYDROCHLORIDE 5 MG/ML
10 INJECTION INTRAMUSCULAR; INTRAVENOUS EVERY 6 HOURS PRN
Status: DISCONTINUED | OUTPATIENT
Start: 2021-10-11 | End: 2021-10-11

## 2021-10-11 RX ORDER — HYDROMORPHONE HCL IN WATER/PF 6 MG/30 ML
PATIENT CONTROLLED ANALGESIA SYRINGE INTRAVENOUS CONTINUOUS
Status: DISCONTINUED | OUTPATIENT
Start: 2021-10-11 | End: 2021-10-12

## 2021-10-11 RX ORDER — SODIUM CHLORIDE 9 MG/ML
25 INJECTION, SOLUTION INTRAVENOUS PRN
Status: DISCONTINUED | OUTPATIENT
Start: 2021-10-11 | End: 2021-10-11

## 2021-10-11 RX ORDER — LIDOCAINE 4 G/G
1 PATCH TOPICAL DAILY
Status: DISCONTINUED | OUTPATIENT
Start: 2021-10-11 | End: 2021-10-12 | Stop reason: HOSPADM

## 2021-10-11 RX ORDER — HYDROMORPHONE HCL 110MG/55ML
0.5 PATIENT CONTROLLED ANALGESIA SYRINGE INTRAVENOUS EVERY 5 MIN PRN
Status: DISCONTINUED | OUTPATIENT
Start: 2021-10-11 | End: 2021-10-11 | Stop reason: HOSPADM

## 2021-10-11 RX ORDER — ONDANSETRON 2 MG/ML
4 INJECTION INTRAMUSCULAR; INTRAVENOUS
Status: DISCONTINUED | OUTPATIENT
Start: 2021-10-11 | End: 2021-10-11 | Stop reason: HOSPADM

## 2021-10-11 RX ORDER — MEPERIDINE HYDROCHLORIDE 25 MG/ML
12.5 INJECTION INTRAMUSCULAR; INTRAVENOUS; SUBCUTANEOUS EVERY 5 MIN PRN
Status: DISCONTINUED | OUTPATIENT
Start: 2021-10-11 | End: 2021-10-11 | Stop reason: HOSPADM

## 2021-10-11 RX ORDER — SODIUM CHLORIDE 0.9 % (FLUSH) 0.9 %
10 SYRINGE (ML) INJECTION PRN
Status: DISCONTINUED | OUTPATIENT
Start: 2021-10-11 | End: 2021-10-11

## 2021-10-11 RX ORDER — MIDAZOLAM HYDROCHLORIDE 1 MG/ML
INJECTION INTRAMUSCULAR; INTRAVENOUS PRN
Status: DISCONTINUED | OUTPATIENT
Start: 2021-10-11 | End: 2021-10-11 | Stop reason: SDUPTHER

## 2021-10-11 RX ORDER — HYDRALAZINE HYDROCHLORIDE 20 MG/ML
5 INJECTION INTRAMUSCULAR; INTRAVENOUS EVERY 10 MIN PRN
Status: DISCONTINUED | OUTPATIENT
Start: 2021-10-11 | End: 2021-10-11 | Stop reason: HOSPADM

## 2021-10-11 RX ORDER — GLYCOPYRROLATE 1 MG/5 ML
SYRINGE (ML) INTRAVENOUS PRN
Status: DISCONTINUED | OUTPATIENT
Start: 2021-10-11 | End: 2021-10-11 | Stop reason: SDUPTHER

## 2021-10-11 RX ORDER — METOCLOPRAMIDE HYDROCHLORIDE 5 MG/ML
10 INJECTION INTRAMUSCULAR; INTRAVENOUS EVERY 6 HOURS PRN
Status: DISCONTINUED | OUTPATIENT
Start: 2021-10-11 | End: 2021-10-12 | Stop reason: HOSPADM

## 2021-10-11 RX ORDER — SODIUM CHLORIDE 0.9 % (FLUSH) 0.9 %
10 SYRINGE (ML) INJECTION EVERY 12 HOURS SCHEDULED
Status: DISCONTINUED | OUTPATIENT
Start: 2021-10-11 | End: 2021-10-11

## 2021-10-11 RX ORDER — PROMETHAZINE HYDROCHLORIDE 25 MG/ML
6.25 INJECTION, SOLUTION INTRAMUSCULAR; INTRAVENOUS EVERY 6 HOURS PRN
Status: DISCONTINUED | OUTPATIENT
Start: 2021-10-11 | End: 2021-10-11

## 2021-10-11 RX ORDER — SCOLOPAMINE TRANSDERMAL SYSTEM 1 MG/1
1 PATCH, EXTENDED RELEASE TRANSDERMAL ONCE
Status: DISCONTINUED | OUTPATIENT
Start: 2021-10-11 | End: 2021-10-12

## 2021-10-11 RX ORDER — APREPITANT 80 MG/1
80 CAPSULE ORAL ONCE
Status: COMPLETED | OUTPATIENT
Start: 2021-10-11 | End: 2021-10-11

## 2021-10-11 RX ORDER — SODIUM CHLORIDE 9 MG/ML
10 INJECTION INTRAVENOUS DAILY
Status: DISCONTINUED | OUTPATIENT
Start: 2021-10-11 | End: 2021-10-12 | Stop reason: HOSPADM

## 2021-10-11 RX ORDER — SODIUM CHLORIDE 0.9 % (FLUSH) 0.9 %
10 SYRINGE (ML) INJECTION EVERY 12 HOURS SCHEDULED
Status: DISCONTINUED | OUTPATIENT
Start: 2021-10-11 | End: 2021-10-12 | Stop reason: HOSPADM

## 2021-10-11 RX ORDER — BUPIVACAINE HYDROCHLORIDE AND EPINEPHRINE 2.5; 5 MG/ML; UG/ML
INJECTION, SOLUTION EPIDURAL; INFILTRATION; INTRACAUDAL; PERINEURAL
Status: COMPLETED | OUTPATIENT
Start: 2021-10-11 | End: 2021-10-11

## 2021-10-11 RX ORDER — SODIUM CHLORIDE 0.9 % (FLUSH) 0.9 %
10 SYRINGE (ML) INJECTION PRN
Status: DISCONTINUED | OUTPATIENT
Start: 2021-10-11 | End: 2021-10-12 | Stop reason: HOSPADM

## 2021-10-11 RX ORDER — KETAMINE HYDROCHLORIDE 10 MG/ML
INJECTION, SOLUTION INTRAMUSCULAR; INTRAVENOUS PRN
Status: DISCONTINUED | OUTPATIENT
Start: 2021-10-11 | End: 2021-10-11 | Stop reason: SDUPTHER

## 2021-10-11 RX ORDER — LABETALOL HYDROCHLORIDE 5 MG/ML
5 INJECTION, SOLUTION INTRAVENOUS EVERY 10 MIN PRN
Status: DISCONTINUED | OUTPATIENT
Start: 2021-10-11 | End: 2021-10-11 | Stop reason: HOSPADM

## 2021-10-11 RX ORDER — HYOSCYAMINE SULFATE 0.5 MG/ML
250 INJECTION, SOLUTION SUBCUTANEOUS EVERY 4 HOURS PRN
Status: DISCONTINUED | OUTPATIENT
Start: 2021-10-11 | End: 2021-10-12 | Stop reason: HOSPADM

## 2021-10-11 RX ADMIN — Medication 0.2 MG: at 08:39

## 2021-10-11 RX ADMIN — SODIUM CHLORIDE, POTASSIUM CHLORIDE, SODIUM LACTATE AND CALCIUM CHLORIDE: 600; 310; 30; 20 INJECTION, SOLUTION INTRAVENOUS at 19:48

## 2021-10-11 RX ADMIN — SODIUM CHLORIDE, POTASSIUM CHLORIDE, SODIUM LACTATE AND CALCIUM CHLORIDE: 600; 310; 30; 20 INJECTION, SOLUTION INTRAVENOUS at 08:27

## 2021-10-11 RX ADMIN — CEFAZOLIN 2000 MG: 10 INJECTION, POWDER, FOR SOLUTION INTRAVENOUS at 21:18

## 2021-10-11 RX ADMIN — HYDROMORPHONE HYDROCHLORIDE 0.5 MG: 2 INJECTION, SOLUTION INTRAMUSCULAR; INTRAVENOUS; SUBCUTANEOUS at 09:44

## 2021-10-11 RX ADMIN — Medication 10 MG: at 10:41

## 2021-10-11 RX ADMIN — SODIUM CHLORIDE, POTASSIUM CHLORIDE, SODIUM LACTATE AND CALCIUM CHLORIDE: 600; 310; 30; 20 INJECTION, SOLUTION INTRAVENOUS at 07:09

## 2021-10-11 RX ADMIN — MAGNESIUM SULFATE HEPTAHYDRATE 1 G: 500 INJECTION, SOLUTION INTRAMUSCULAR; INTRAVENOUS at 08:49

## 2021-10-11 RX ADMIN — APREPITANT 80 MG: 80 CAPSULE ORAL at 07:09

## 2021-10-11 RX ADMIN — ALBUTEROL SULFATE 2 PUFF: 90 AEROSOL, METERED RESPIRATORY (INHALATION) at 12:39

## 2021-10-11 RX ADMIN — ENOXAPARIN SODIUM 30 MG: 30 INJECTION SUBCUTANEOUS at 21:17

## 2021-10-11 RX ADMIN — SODIUM CHLORIDE, PRESERVATIVE FREE 10 ML: 5 INJECTION INTRAVENOUS at 21:32

## 2021-10-11 RX ADMIN — ENOXAPARIN SODIUM 30 MG: 30 INJECTION SUBCUTANEOUS at 07:09

## 2021-10-11 RX ADMIN — CEFAZOLIN 2000 MG: 10 INJECTION, POWDER, FOR SOLUTION INTRAVENOUS at 08:23

## 2021-10-11 RX ADMIN — Medication 160 MG: at 08:29

## 2021-10-11 RX ADMIN — DEXAMETHASONE SODIUM PHOSPHATE 10 MG: 4 INJECTION, SOLUTION INTRAMUSCULAR; INTRAVENOUS at 08:43

## 2021-10-11 RX ADMIN — PROPOFOL 200 MG: 10 INJECTION, EMULSION INTRAVENOUS at 08:29

## 2021-10-11 RX ADMIN — MIDAZOLAM 2 MG: 1 INJECTION INTRAMUSCULAR; INTRAVENOUS at 08:26

## 2021-10-11 RX ADMIN — ROCURONIUM BROMIDE 40 MG: 10 INJECTION, SOLUTION INTRAVENOUS at 08:38

## 2021-10-11 RX ADMIN — KETAMINE HYDROCHLORIDE 30 MG: 10 INJECTION, SOLUTION INTRAMUSCULAR; INTRAVENOUS at 09:03

## 2021-10-11 RX ADMIN — SUGAMMADEX 200 MG: 100 INJECTION, SOLUTION INTRAVENOUS at 09:41

## 2021-10-11 RX ADMIN — LIDOCAINE HYDROCHLORIDE 100 MG: 20 INJECTION, SOLUTION EPIDURAL; INFILTRATION; INTRACAUDAL; PERINEURAL at 08:29

## 2021-10-11 RX ADMIN — SODIUM CHLORIDE, POTASSIUM CHLORIDE, SODIUM LACTATE AND CALCIUM CHLORIDE: 600; 310; 30; 20 INJECTION, SOLUTION INTRAVENOUS at 10:40

## 2021-10-11 RX ADMIN — SODIUM CHLORIDE, POTASSIUM CHLORIDE, SODIUM LACTATE AND CALCIUM CHLORIDE: 600; 310; 30; 20 INJECTION, SOLUTION INTRAVENOUS at 09:30

## 2021-10-11 RX ADMIN — FENTANYL CITRATE 100 MCG: 50 INJECTION, SOLUTION INTRAMUSCULAR; INTRAVENOUS at 08:28

## 2021-10-11 RX ADMIN — ONDANSETRON 4 MG: 2 INJECTION INTRAMUSCULAR; INTRAVENOUS at 09:30

## 2021-10-11 RX ADMIN — ROCURONIUM BROMIDE 10 MG: 10 INJECTION, SOLUTION INTRAVENOUS at 08:29

## 2021-10-11 RX ADMIN — PANTOPRAZOLE SODIUM 40 MG: 40 INJECTION, POWDER, FOR SOLUTION INTRAVENOUS at 21:31

## 2021-10-11 ASSESSMENT — PULMONARY FUNCTION TESTS
PIF_VALUE: 26
PIF_VALUE: 20
PIF_VALUE: 23
PIF_VALUE: 25
PIF_VALUE: 26
PIF_VALUE: 26
PIF_VALUE: 25
PIF_VALUE: 23
PIF_VALUE: 24
PIF_VALUE: 25
PIF_VALUE: 24
PIF_VALUE: 19
PIF_VALUE: 23
PIF_VALUE: 1
PIF_VALUE: 17
PIF_VALUE: 19
PIF_VALUE: 4
PIF_VALUE: 23
PIF_VALUE: 23
PIF_VALUE: 24
PIF_VALUE: 26
PIF_VALUE: 5
PIF_VALUE: 18
PIF_VALUE: 2
PIF_VALUE: 23
PIF_VALUE: 23
PIF_VALUE: 24
PIF_VALUE: 23
PIF_VALUE: 18
PIF_VALUE: 27
PIF_VALUE: 4
PIF_VALUE: 1
PIF_VALUE: 19
PIF_VALUE: 24
PIF_VALUE: 23
PIF_VALUE: 20
PIF_VALUE: 0
PIF_VALUE: 19
PIF_VALUE: 18
PIF_VALUE: 24
PIF_VALUE: 23
PIF_VALUE: 24
PIF_VALUE: 28
PIF_VALUE: 27
PIF_VALUE: 22
PIF_VALUE: 3
PIF_VALUE: 26
PIF_VALUE: 27
PIF_VALUE: 2
PIF_VALUE: 24
PIF_VALUE: 17
PIF_VALUE: 25
PIF_VALUE: 24
PIF_VALUE: 5
PIF_VALUE: 3
PIF_VALUE: 18
PIF_VALUE: 20
PIF_VALUE: 5
PIF_VALUE: 2
PIF_VALUE: 24
PIF_VALUE: 17
PIF_VALUE: 2
PIF_VALUE: 24
PIF_VALUE: 25
PIF_VALUE: 18
PIF_VALUE: 5
PIF_VALUE: 20
PIF_VALUE: 27
PIF_VALUE: 25
PIF_VALUE: 4
PIF_VALUE: 22
PIF_VALUE: 23
PIF_VALUE: 26
PIF_VALUE: 24
PIF_VALUE: 23
PIF_VALUE: 19
PIF_VALUE: 0
PIF_VALUE: 25
PIF_VALUE: 23
PIF_VALUE: 0
PIF_VALUE: 3
PIF_VALUE: 24
PIF_VALUE: 19
PIF_VALUE: 17
PIF_VALUE: 26
PIF_VALUE: 1
PIF_VALUE: 4

## 2021-10-11 ASSESSMENT — PAIN SCALES - GENERAL: PAINLEVEL_OUTOF10: 7

## 2021-10-11 ASSESSMENT — PAIN DESCRIPTION - LOCATION: LOCATION: ABDOMEN

## 2021-10-11 ASSESSMENT — PAIN - FUNCTIONAL ASSESSMENT: PAIN_FUNCTIONAL_ASSESSMENT: 0-10

## 2021-10-11 ASSESSMENT — LIFESTYLE VARIABLES: SMOKING_STATUS: 0

## 2021-10-11 ASSESSMENT — PAIN DESCRIPTION - PAIN TYPE: TYPE: SURGICAL PAIN

## 2021-10-11 NOTE — PROGRESS NOTES
Patient up to the bathroom to void, tolerated well. Pain managed with PCA. Family remains at beside.

## 2021-10-11 NOTE — OP NOTE
Operative Note      Patient: Ezra Grimes  YOB: 1990  MRN: [id number]    Date of Procedure: 10/11/2021  [facility]) Physicians   [facility]  [address], [address] [place] [id number] . Phone: [phone]  Fax: [phone]             Procedure Note    Indications: The patient was evaluated by our multidisciplinary team and was found to be a good candidate for weight loss surgery for future weight loss. Body mass index is 42.75 kg/m². Farideh Borjas Risks and benefits explained and Read Cornelio wants to proceed. Pre-operative Diagnosis:   Patient Active Problem List   Diagnosis     (spontaneous vaginal delivery)    Post term pregnancy at 39 weeks gestation    Essential hypertension    Morbid obesity with BMI of 45.0-49.9, adult ([address]    Chronic GERD    Chronic midline low back pain with right-sided sciatica    Prediabetes    Vitamin D deficiency    Morbid obesity with BMI of 40.0-44.9, adult ([address]    Ventral hernia without obstruction or gangrene         Post-operative Diagnosis:   Same      Procedure:    - Laparoscopic Sleeve Gastrectomy. - Laparoscopic 1ry Ventral hernia Repair. Surgeon: Lacey Ortiz MD, FACS. Anesthesia: General endotracheal anesthesia        Procedure Details   The patient was seen again in the Holding Room. The risks, benefits, complications, treatment options, and expected outcomes were discussed with the patient and/or family. Including but not limited to; The possibilities of reaction to medication, pulmonary aspiration, perforation of viscus, bleeding, recurrent infection, strictures, leaks, failure to lose weight, regaining weight,  malnutrition, the need for additional procedures, failure to diagnose a condition, and creating a complication requiring transfusion or operation were discussed. There was concurrence with the proposed plan and informed consent was obtained.   The site of surgery was properly noted/marked. The patient was taken to Operating Room, identified as Taj Sheikh and the procedure verified as Laparoscopic Sleeve Gastrectomy & other indicated procedures. A Time Out was held and the above information confirmed. The patient was placed in the supine position and general anesthesia was induced, along with placement of orogastric tube, Venodyne boots. Lovenox SQ, Emend and IV Antibiotics given pre-operatively. All pressure points were padded properly. Patient prepped and draped in sterile fashion. A left upper quadrant incision was made and a veres needle was inserted after confirming with saline drop test.  After adequate pneumoperitoneum was obtained, a 5 mm trocar was inserted. This was followed by a endoscope which confirmed intra-abdominal placement and there was no injury on initial trocar placement. Additional ports were placed in the standard positions under direct vision. The abdomen was initially explored and noted ventral hernia in the periumbilical region. A liver retractor was inserted through a small incision in the upper midline, lifted the liver upward, and was then secured to the OR table. The pylorus was identified and measurement was taken approximately 4-6 cm from the pylorus along the greater curvature of the stomach. The harmonic scalpel / ligasure was used to take down the attachments and short gastric vessels along the greater curve of the stomach. This was continued until all attachments were taken down and continued to the gastro-esophageal junction. A 34 Amharic dilator was placed along the lesser curvature and into the pylorus. A stapler was fired along the dilator to create an appropriate sized gastric sleeve pouch. Purple followed Tan firings were used to create the sleeve. The staple line looked very healthy and no bleeding from staple line.   The stomach was confirmed to be completely divided with uniform shape,  no twist in the sleeve and wide patent incisura. A 2-0 vicryl suture was used to over-sew and imbricate the sleeve staple line. The staple line was completely over-sewn. The dilator was removed and an Edlich tube was advanced across the sleeve to ensure patency mainly at incisura, then retracted to just above the GE junction. The stomach distal to the staple line was clamped and methylene blue saline was injected under pressure confirming No obstruction (flow noted to duodenum) and No leak. Patient has infra umbilical / ventral hernia, through separate incisions, total of two  0.0 Ethibond stitches using suture passing device under direct visualization were used to close the defect. Pneumoperitoneum removed and stitches tied , then repair was inspected after re-applying pneumoperitoneum and it was intact. The abdomen was carefully inspected and there was no bleeding or any other abnormality. The stomach was brought out through the RUQ incision. Hemostasis was confirmed. Snow applied along suture line and/or biopsy sites to ensure hemostasis. The 15 and 12 port sites was closed using 0.0 Vicryl  suture at the level of the fascia and that was done under direct vision with the laparoscope to ensure proper closure and nothing entrapped. Local Anesthetic used at port sites. The trocar site skin wounds were closed using 4.0 Vicryl after copious Irrigation of the wounds. Dermabond / or steri strips applied. Instrument, sponge, and needle counts were correct at the conclusion of the case. Findings:  As above. Estimated Blood Loss:  Minimal           Drains: none           Total IV Fluids: 1300 ml           Specimens: Stomach (Subtotal)            Complications:  None; patient tolerated the procedure well. Disposition: PACU - hemodynamically stable. Condition: stable    Attending Attestation: I was present and scrubbed for the entire procedure.             Electronically signed by Duyen Ortiz Lisbeth Herrera MD on 10/11/2021 at 10:11 AM

## 2021-10-11 NOTE — H&P
[facility]) Physicians   [facility]  [address] [place] [address], [address] [place] [id number] . Phone: [phone]  Fax: [phone]              Patient's History and Physical from September 28, 2021 was reviewed. Naldo Wells seen and examined. There has been no change. HISTORY OF PRESENT ILLNESS:    Natalia Stock is a very pleasant 33 y.o. with Body mass index is 42.75 kg/m². who is presenting for planned Laparoscopic Sleeve Gastrectomy for future weight loss. Past Medical History:        Diagnosis Date    Asthma     associated with seasonal allergies    Hypertension     Pruritic urticarial papules and plaques of pregnancy      Past Surgical History:        Procedure Laterality Date    TONSILLECTOMY      UPPER GASTROINTESTINAL ENDOSCOPY N/A 4/23/2021    EGD BIOPSY performed by Lesly Goss MD at [address] [place] [address]     Medications Prior to Admission:   Medications Prior to Admission: Cyanocobalamin (B-12) 1000 MCG CAPS, Take by mouth  budesonide-formoterol (SYMBICORT) 160-4.5 MCG/ACT AERO, Inhale 2 puffs into the lungs daily  famotidine (PEPCID) 20 MG tablet, Take 1 tablet by mouth daily  montelukast (SINGULAIR) 5 MG chewable tablet, Take 5 mg by mouth nightly  amLODIPine (NORVASC) 10 MG tablet, daily   albuterol sulfate HFA (PROVENTIL HFA) 108 (90 Base) MCG/ACT inhaler, Inhale 2 puffs into the lungs every 4 hours as needed for Wheezing  vitamin D (CHOLECALCIFEROL) [address]74 UNIT CAPS, Take 1 capsule by mouth once a week  Cholecalciferol 50 MCG (2000 UT) TABS, Take 1 tablet by mouth daily Take 1 tablet by mouth daily. Start this medication after you finish the weekly regimen    Allergies:  No known allergies and Other    Social History:   TOBACCO:   reports that she has never smoked. She has never used smokeless tobacco.  ETOH:   reports previous alcohol use.   Family History:       Adopted: Yes         REVIEW OF SYSTEMS:    Review of Systems - History obtained from the patient  General ROS: obesity  Psychological ROS: negative  Ophthalmic ROS: negative  Neurological ROS: negative  ENT ROS: negative  Allergy and Immunology ROS: negative  Hematological and Lymphatic ROS: negative  Endocrine ROS: negative  Breast ROS: negative  Respiratory ROS: negative  Cardiovascular ROS: negative  Gastrointestinal ROS:negative  Genito-Urinary ROS: negative  Musculoskeletal ROS: negative   Skin ROS: negative      PHYSICAL EXAM:      BP (!) 142/90   Pulse 97   Temp 98.2 °F (36.8 °C) (Temporal)   Resp 14   Ht 5' 4\" (1.626 m)   Wt 249 lb 0.4 oz (113 kg)   LMP 08/01/2021   SpO2 95%   BMI 42.75 kg/m²  I        Physical Exam   Vitals Reviewed   Constitutional: Patient is oriented to person, place, and time. Patient appears well-developed and well-nourished. Patient is active and cooperative. Non-toxic appearance. No distress. HENT:   Head: Normocephalic and atraumatic. Head is without abrasion and without laceration. Hair is normal.   Right Ear: External ear normal. No lacerations. No drainage, swelling . Left Ear: External ear normal. No lacerations. No drainage, swelling. Nose: Nose normal. No nose lacerations or nasal deformity. Eyes: Conjunctivae, EOM and lids are normal. Right eye exhibits no discharge. No foreign body present in the right eye. Left eye exhibits no discharge. No foreign body present in the left eye. No scleral icterus. Neck: Trachea normal and normal range of motion. No JVD present. Pulmonary/Chest: Effort normal. No accessory muscle usage or stridor. No apnea. No respiratory distress. Cardiovascular: Normal rate and no JVD. Abdominal: Normal appearance. Patient exhibits no distension. Musculoskeletal: Normal range of motion. Patient exhibits no edema. Neurological: Patient is alert and oriented to person, place, and time. Patient has normal strength. GCS eye subscore is 4. GCS verbal subscore is 5. GCS motor subscore is 6. Skin: Skin is warm and dry. No abrasion and no rash noted. Patient is not diaphoretic. No cyanosis or erythema. Psychiatric: Patient has a normal mood and affect. Speech is normal and behavior is normal. Cognition and memory are normal.       DATA:  CBC:   Lab Results   Component Value Date    WBC 8.9 10/05/2021    RBC 4.74 10/05/2021    HGB 13.5 10/05/2021    HCT 41.2 10/05/2021    MCV 86.8 10/05/2021    MCH 28.4 10/05/2021    MCHC 32.7 10/05/2021    RDW 13.8 10/05/2021     10/05/2021    MPV 7.4 10/05/2021     CMP:    Lab Results   Component Value Date     10/05/2021    K 4.4 10/05/2021     10/05/2021    CO2 21 10/05/2021    BUN 14 10/05/2021    CREATININE 0.8 10/05/2021    GFRAA >60 10/05/2021    GFRAA >60 08/22/2010    AGRATIO 1.7 10/05/2021    LABGLOM >60 10/05/2021    GLUCOSE 95 10/05/2021    PROT 6.9 10/05/2021    PROT 5.9 08/22/2010    LABALBU 4.3 10/05/2021    CALCIUM 9.4 10/05/2021    BILITOT 0.5 10/05/2021    ALKPHOS 59 10/05/2021    AST 18 10/05/2021    ALT 18 10/05/2021       ASSESSMENT AND PLAN:      Quoc Hsieh is a 33 y.o. female with Body mass index is 42.75 kg/m². ,  patient is deemed appropriate candidate for weight loss surgery by our multidisciplinary team.       Following The Metabolic and Bariatric Surgery Accreditation and Quality Improvement Program [facility]), and [employer] (ACS) recommendations, the Bariatric Surgical Risk/Benefit Calculator was used for Quoc Hsieh. Report was discussed with Francia Bond and patient wishes to proceed with surgery, fully understanding the risks and benefits. Of note, [facility] Bariatric Surgical Risk/Benefit Calculator estimates the chance of an unfavorable outcome (such as a complication or death), the chance of remission of weight-related comorbidities, and the patient's BMI, weight change, and percent total weight change after surgery.  These quantities are estimated based upon information the patient gives the healthcare provider about prior health history. The estimates are calculated using data from a large number of patients who had a primary bariatric surgical procedure similar to the one the patient may have. Please note the risk percentages, remission percentages, BMI, weight change, and percent total weight change provided to you by the risk/benefit calculator are only estimates. These estimates only take certain information into account. There may be other factors that are not included in the estimate which may increase or decrease the risk of a complication, the chance of remission of a weight-related comorbidity, or the amount of weight the patient loses. These estimates are not a guarantee of results. A complication after surgery may happen even if the risk is low, a weight-related comorbidity may not go into remission even if the chances are high, and a patient may lose more or less weight than predicted. This information is not intended to replace the advice of a doctor or healthcare provider about the diagnosis, treatment, or potential outcomes. The Provider is not responsible for medical decisions that may be made by the patient based on the risk/benefit calculator estimates, since these estimates are provided for informational purposes. Patients should always consult their doctor or other health care provider before deciding on a treatment plan. Both open and laparoscopic approach were explained in details. Benefits and risks explained. Informed consent obtained. Risks including but not limited to; Infection, bleeding, gastric leak or obstruction, persistent nausea, vomiting, or reflux, injury to surrounding structures, risks of anesthesia, stricture, delayed gastric emptying, staple line leak, incisional hernia, malnutrition , hair loss, and/ or Conversion to Open surgery may be necessary.   Failure to lose or maintain weight loss, Gallstones or Kidney Stones, Deep Venous Thrombosis , pulmonary embolism and / or death.     With obesity and/or Fatty liver , I may elect to perform liver core biopsy to have  Baseline idea on liver pathology if there is abnormal Liver Functions or if there is hepatomegaly &/or lesion, risks include but not limited to bile leak, liver hematoma or failure to diagnose a condition. Jewel Farias understands that there may be a need to perform other urgent or necessary procedures that were unanticipated. Rochelle Hartman consent to the performance of any additional procedures determined during the original procedure to be in their best interests and where delay might cause additional harm or put patient at risk for additional anesthesia risk for required by a second procedure and that will be determined by the surgeon. I did explain thoroughly to the patient that compliance with pre- and post op diet and other recommendations are integral part to improve the chances of successful weight loss and also not following it could end with serious health complications. We discussed that our goal is to ameliorate the medical problems and not to obtain a specific body mass index. Patient understands the risks and benefits and wishes to proceed with the procedure. Also understands if BMI is lower than 40 without significant co morbid conditions, concerns for risks of surgery being somewhat higher over long run, however patient wants to proceed and fully understands the risks. Clearly BMI over 35 does impose very serious health risks as well chances of losing weight on diet only is very limited and sustaining weight loss is even less, thus surgery is certainly recommended for long term weight loss and better health overall given compliance. I advised the patient that we can't guarantee final insurance approval.      The patient was counseled at length about the risks of yony Covid-19 during their perioperative period and any recovery window from their procedure.   The patient was made aware that yony Covid-19  may worsen their prognosis for recovering from their procedure  and lend to a higher morbidity and/or mortality risk. All material risks, benefits, and reasonable alternatives including postponing the procedure were discussed. The patient does wish to proceed with the procedure at this time. 1.  Plan for Laparoscopic Sleeve Gastrectomy with General Anesthesia. Sonal Olivier MD, FACS.

## 2021-10-11 NOTE — ANESTHESIA POSTPROCEDURE EVALUATION
Department of Anesthesiology  Postprocedure Note    Patient: Shabnam Esquivel  MRN: [id number]  YOB: 1990  Date of evaluation: 10/11/2021  Time:  10:06 AM     Procedure Summary     Date: 10/11/21 Room / Location: [address]    Anesthesia Start: Mitcheal Sanjeev Anesthesia Stop: 5812    Procedure: [address] (N/A Abdomen) Diagnosis: (E66.01 MORBID OBESITY)    Surgeons: Alton Dumont MD Responsible Provider: Ora Mena MD    Anesthesia Type: general ASA Status: 3          Anesthesia Type: general    Evan Phase I: Evan Score: 8    Evan Phase II:      Last vitals: Reviewed and per EMR flowsheets.        Anesthesia Post Evaluation    Patient location during evaluation: PACU  Patient participation: complete - patient participated  Level of consciousness: awake and alert  Airway patency: patent  Nausea & Vomiting: no vomiting and no nausea  Complications: no  Cardiovascular status: hemodynamically stable  Respiratory status: acceptable  Hydration status: stable

## 2021-10-11 NOTE — ANESTHESIA PRE PROCEDURE
 ceFAZolin (ANCEF) 2000 mg in dextrose 5 % 50 mL IVPB  2,000 mg IntraVENous On Call to Ilir Sadler MD        meperidine (DEMEROL) injection 12.5 mg  12.5 mg IntraVENous Q5 Min PRN Barbie Duvall MD        HYDROmorphone (DILAUDID) injection 0.5 mg  0.5 mg IntraVENous Q5 Min PRN Barbie Duvall MD        ondansetron [facility]) injection 4 mg  4 mg IntraVENous Once PRN Barbie Duvall MD        labetalol (NORMODYNE;TRANDATE) injection 5 mg  5 mg IntraVENous Q10 Min PRN Barbie Duvall MD        hydrALAZINE (APRESOLINE) injection 5 mg  5 mg IntraVENous Q10 Min PRN Barbie Duvall MD           Allergies:     Allergies   Allergen Reactions    No Known Allergies     Other      Cats and Dogs       Problem List:    Patient Active Problem List   Diagnosis Code     (spontaneous vaginal delivery) O80    Post term pregnancy at 39 weeks gestation O46.0, Z3A.41    Essential hypertension I10    Morbid obesity with BMI of 45.0-49.9, adult ([facility] E66.01, Z68.42    Chronic GERD K21.9    Chronic midline low back pain with right-sided sciatica M54.41, G89.29    Prediabetes R73.03    Vitamin D deficiency E55.9    Morbid obesity with BMI of 40.0-44.9, adult ([facility] E66.01, Z68.41       Past Medical History:        Diagnosis Date    Asthma     associated with seasonal allergies    Hypertension     Pruritic urticarial papules and plaques of pregnancy        Past Surgical History:        Procedure Laterality Date    TONSILLECTOMY      UPPER GASTROINTESTINAL ENDOSCOPY N/A 2021    EGD BIOPSY performed by Oly Justice MD at [address] History:    Social History     Tobacco Use    Smoking status: Never Smoker    Smokeless tobacco: Never Used   Substance Use Topics    Alcohol use: Not Currently     Comment: socially                                 Counseling given: Not Answered      Vital Signs (Current):   Vitals:    21 1034 10/11/21 3641 10/11/21 0738   BP:   (!) 142/90   Pulse:   97   Resp:   14   Temp:   98.2 °F (36.8 °C)   TempSrc:   Temporal   SpO2:   95%   Weight: 262 lb (118.8 kg) 249 lb (112.9 kg) 249 lb 0.4 oz (113 kg)   Height: 5' 4\" (1.626 m) 5' 4\" (1.626 m) 5' 4\" (1.626 m)                                              BP Readings from Last 3 Encounters:   10/11/21 (!) 142/90   09/09/21 132/84   08/19/21 120/81       NPO Status: Time of last liquid consumption: 0515 (sips with medication)                        Time of last solid consumption: 2000                        Date of last liquid consumption: 10/11/21                        Date of last solid food consumption: 10/09/21    BMI:   Wt Readings from Last 3 Encounters:   10/11/21 249 lb 0.4 oz (113 kg)   09/21/21 264 lb (119.7 kg)   09/09/21 258 lb (117 kg)     Body mass index is 42.75 kg/m².     CBC:   Lab Results   Component Value Date    WBC 8.9 10/05/2021    RBC 4.74 10/05/2021    HGB 13.5 10/05/2021    HCT 41.2 10/05/2021    MCV 86.8 10/05/2021    RDW 13.8 10/05/2021     10/05/2021       CMP:   Lab Results   Component Value Date     10/05/2021    K 4.4 10/05/2021     10/05/2021    CO2 21 10/05/2021    BUN 14 10/05/2021    CREATININE 0.8 10/05/2021    GFRAA >60 10/05/2021    GFRAA >60 08/22/2010    AGRATIO 1.7 10/05/2021    LABGLOM >60 10/05/2021    GLUCOSE 95 10/05/2021    PROT 6.9 10/05/2021    PROT 5.9 08/22/2010    CALCIUM 9.4 10/05/2021    BILITOT 0.5 10/05/2021    ALKPHOS 59 10/05/2021    AST 18 10/05/2021    ALT 18 10/05/2021       POC Tests:   Recent Labs     10/11/21  0708   POCGLU 81       Coags:   Lab Results   Component Value Date    PROTIME 11.5 04/07/2021    INR 0.99 04/07/2021    APTT 27.1 08/22/2010       HCG (If Applicable):   Lab Results   Component Value Date    PREGTESTUR Negative 10/11/2021        ABGs: No results found for: PHART, PO2ART, ORN3URR, IAI8GPK, BEART, Y6OOWRUM     Type & Screen (If Applicable):  Lab Results   Component Value Date

## 2021-10-12 ENCOUNTER — APPOINTMENT (OUTPATIENT)
Dept: GENERAL RADIOLOGY | Age: 31
End: 2021-10-12
Attending: SURGERY
Payer: MEDICAID

## 2021-10-12 VITALS
DIASTOLIC BLOOD PRESSURE: 86 MMHG | OXYGEN SATURATION: 97 % | HEART RATE: 74 BPM | WEIGHT: 249.03 LBS | HEIGHT: 64 IN | SYSTOLIC BLOOD PRESSURE: 133 MMHG | BODY MASS INDEX: 42.52 KG/M2 | TEMPERATURE: 98.1 F | RESPIRATION RATE: 16 BRPM

## 2021-10-12 PROBLEM — E66.01 MORBID OBESITY WITH BMI OF 45.0-49.9, ADULT (HCC): Status: RESOLVED | Noted: 2021-03-11 | Resolved: 2021-10-12

## 2021-10-12 LAB
ANION GAP SERPL CALCULATED.3IONS-SCNC: 12 MMOL/L (ref 3–16)
BUN BLDV-MCNC: 5 MG/DL (ref 7–20)
CALCIUM SERPL-MCNC: 8.7 MG/DL (ref 8.3–10.6)
CHLORIDE BLD-SCNC: 103 MMOL/L (ref 99–110)
CO2: 19 MMOL/L (ref 21–32)
CREAT SERPL-MCNC: 0.6 MG/DL (ref 0.6–1.1)
GFR AFRICAN AMERICAN: >60
GFR NON-AFRICAN AMERICAN: >60
GLUCOSE BLD-MCNC: 91 MG/DL (ref 70–99)
HCT VFR BLD CALC: 39.9 % (ref 36–48)
HEMOGLOBIN: 13.4 G/DL (ref 12–16)
MCH RBC QN AUTO: 29.3 PG (ref 26–34)
MCHC RBC AUTO-ENTMCNC: 33.7 G/DL (ref 31–36)
MCV RBC AUTO: 86.8 FL (ref 80–100)
PDW BLD-RTO: 13.7 % (ref 12.4–15.4)
PLATELET # BLD: 383 K/UL (ref 135–450)
PMV BLD AUTO: 7.5 FL (ref 5–10.5)
POTASSIUM SERPL-SCNC: 4.3 MMOL/L (ref 3.5–5.1)
RBC # BLD: 4.59 M/UL (ref 4–5.2)
SODIUM BLD-SCNC: 134 MMOL/L (ref 136–145)
WBC # BLD: 16 K/UL (ref 4–11)

## 2021-10-12 PROCEDURE — 36415 COLL VENOUS BLD VENIPUNCTURE: CPT

## 2021-10-12 PROCEDURE — 80048 BASIC METABOLIC PNL TOTAL CA: CPT

## 2021-10-12 PROCEDURE — 99024 POSTOP FOLLOW-UP VISIT: CPT | Performed by: NURSE PRACTITIONER

## 2021-10-12 PROCEDURE — 6370000000 HC RX 637 (ALT 250 FOR IP): Performed by: SURGERY

## 2021-10-12 PROCEDURE — 96372 THER/PROPH/DIAG INJ SC/IM: CPT

## 2021-10-12 PROCEDURE — G0378 HOSPITAL OBSERVATION PER HR: HCPCS

## 2021-10-12 PROCEDURE — 85027 COMPLETE CBC AUTOMATED: CPT

## 2021-10-12 PROCEDURE — APPSS180 APP SPLIT SHARED TIME > 60 MINUTES: Performed by: NURSE PRACTITIONER

## 2021-10-12 PROCEDURE — 2580000003 HC RX 258: Performed by: SURGERY

## 2021-10-12 PROCEDURE — 2580000003 HC RX 258: Performed by: ANESTHESIOLOGY

## 2021-10-12 PROCEDURE — 96374 THER/PROPH/DIAG INJ IV PUSH: CPT

## 2021-10-12 PROCEDURE — C9113 INJ PANTOPRAZOLE SODIUM, VIA: HCPCS | Performed by: SURGERY

## 2021-10-12 PROCEDURE — 74240 X-RAY XM UPR GI TRC 1CNTRST: CPT

## 2021-10-12 PROCEDURE — 96375 TX/PRO/DX INJ NEW DRUG ADDON: CPT

## 2021-10-12 PROCEDURE — 6360000002 HC RX W HCPCS: Performed by: NURSE PRACTITIONER

## 2021-10-12 PROCEDURE — 6360000004 HC RX CONTRAST MEDICATION

## 2021-10-12 PROCEDURE — 6360000002 HC RX W HCPCS: Performed by: SURGERY

## 2021-10-12 RX ORDER — ONDANSETRON 4 MG/1
8 TABLET, ORALLY DISINTEGRATING ORAL EVERY 8 HOURS PRN
Qty: 30 TABLET | Refills: 0 | Status: SHIPPED | OUTPATIENT
Start: 2021-10-12 | End: 2021-12-01

## 2021-10-12 RX ORDER — PROMETHAZINE HYDROCHLORIDE 6.25 MG/5ML
12.5 SYRUP ORAL EVERY 6 HOURS PRN
Status: DISCONTINUED | OUTPATIENT
Start: 2021-10-12 | End: 2021-10-12 | Stop reason: HOSPADM

## 2021-10-12 RX ORDER — OXYCODONE HYDROCHLORIDE AND ACETAMINOPHEN 5; 325 MG/1; MG/1
2 TABLET ORAL EVERY 4 HOURS PRN
Status: DISCONTINUED | OUTPATIENT
Start: 2021-10-12 | End: 2021-10-12 | Stop reason: HOSPADM

## 2021-10-12 RX ORDER — ONDANSETRON 4 MG/1
8 TABLET, ORALLY DISINTEGRATING ORAL EVERY 8 HOURS PRN
Qty: 30 TABLET | Refills: 1 | Status: SHIPPED | OUTPATIENT
Start: 2021-10-12 | End: 2021-12-01 | Stop reason: ALTCHOICE

## 2021-10-12 RX ORDER — AMLODIPINE BESYLATE 5 MG/1
10 TABLET ORAL DAILY
Status: DISCONTINUED | OUTPATIENT
Start: 2021-10-12 | End: 2021-10-12 | Stop reason: HOSPADM

## 2021-10-12 RX ORDER — ONDANSETRON 4 MG/1
8 TABLET, ORALLY DISINTEGRATING ORAL EVERY 8 HOURS PRN
Status: DISCONTINUED | OUTPATIENT
Start: 2021-10-12 | End: 2021-10-12 | Stop reason: HOSPADM

## 2021-10-12 RX ORDER — KETOROLAC TROMETHAMINE 30 MG/ML
15 INJECTION, SOLUTION INTRAMUSCULAR; INTRAVENOUS EVERY 6 HOURS
Status: COMPLETED | OUTPATIENT
Start: 2021-10-12 | End: 2021-10-12

## 2021-10-12 RX ORDER — OXYCODONE HYDROCHLORIDE AND ACETAMINOPHEN 5; 325 MG/1; MG/1
1 TABLET ORAL EVERY 6 HOURS PRN
Qty: 28 TABLET | Refills: 0 | Status: SHIPPED | OUTPATIENT
Start: 2021-10-12 | End: 2021-10-19

## 2021-10-12 RX ORDER — OXYCODONE HYDROCHLORIDE AND ACETAMINOPHEN 5; 325 MG/1; MG/1
1 TABLET ORAL EVERY 4 HOURS PRN
Status: DISCONTINUED | OUTPATIENT
Start: 2021-10-12 | End: 2021-10-12 | Stop reason: HOSPADM

## 2021-10-12 RX ADMIN — HYOSCYAMINE SULFATE 250 MCG: 0.5 INJECTION, SOLUTION SUBCUTANEOUS at 02:04

## 2021-10-12 RX ADMIN — KETOROLAC TROMETHAMINE 15 MG: 30 INJECTION, SOLUTION INTRAMUSCULAR; INTRAVENOUS at 12:30

## 2021-10-12 RX ADMIN — CEFAZOLIN 2000 MG: 10 INJECTION, POWDER, FOR SOLUTION INTRAVENOUS at 05:11

## 2021-10-12 RX ADMIN — DIPHENHYDRAMINE HYDROCHLORIDE 12.5 MG: 50 INJECTION, SOLUTION INTRAMUSCULAR; INTRAVENOUS at 01:46

## 2021-10-12 RX ADMIN — SODIUM CHLORIDE, POTASSIUM CHLORIDE, SODIUM LACTATE AND CALCIUM CHLORIDE: 600; 310; 30; 20 INJECTION, SOLUTION INTRAVENOUS at 03:03

## 2021-10-12 RX ADMIN — SODIUM CHLORIDE, PRESERVATIVE FREE 10 ML: 5 INJECTION INTRAVENOUS at 09:32

## 2021-10-12 RX ADMIN — ENOXAPARIN SODIUM 30 MG: 30 INJECTION SUBCUTANEOUS at 09:30

## 2021-10-12 RX ADMIN — IOHEXOL 40 ML: 350 INJECTION, SOLUTION INTRAVENOUS at 10:33

## 2021-10-12 RX ADMIN — ONDANSETRON 4 MG: 2 INJECTION INTRAMUSCULAR; INTRAVENOUS at 12:27

## 2021-10-12 RX ADMIN — PANTOPRAZOLE SODIUM 40 MG: 40 INJECTION, POWDER, FOR SOLUTION INTRAVENOUS at 09:30

## 2021-10-12 ASSESSMENT — PAIN DESCRIPTION - PAIN TYPE: TYPE: SURGICAL PAIN

## 2021-10-12 ASSESSMENT — PAIN SCALES - GENERAL: PAINLEVEL_OUTOF10: 7

## 2021-10-12 ASSESSMENT — PAIN DESCRIPTION - LOCATION: LOCATION: ABDOMEN

## 2021-10-12 NOTE — PROGRESS NOTES
Pt. Brought to floor ambulated independently from stretcher to bed. A&O x4. Bedside report given, 5 abd incisions CDI. Does not have abd binder on at this time. Introduced self, oriented to room and call light system. No family at bedside at this time. Reviewed POC. All questions answered. The care plan and education has been reviewed and mutually agreed upon with the patient.

## 2021-10-12 NOTE — PROGRESS NOTES
Discharge instructions reviewed with patient. Prescriptions x2 for percocet and zofran filled by outpatient pharmacy and delivered to patient. Discharge to care of family.

## 2021-10-12 NOTE — PROGRESS NOTES
PM assessment completed. Pt resting well in bed with no c/o pain or discomfort at this time. Pt up independently to restroom and to ambulate in thomas. No needs voiced. Fall precautions in place, hourly rounding, call light and belongings in reach, bed in lowest position, wheels locked in place, side rails up x 2, walkways free of clutter.

## 2021-10-12 NOTE — PROGRESS NOTES
CLINICAL PHARMACY NOTE: MEDS TO BEDS    Total # of Prescriptions Filled: 2   The following medications were delivered to the patient:  · Zofran 4 mg  · Percocet 5-325 mg    Additional Documentation:  Delivered to Patient=signed  Candice Reis CPhT

## 2021-10-12 NOTE — DISCHARGE SUMMARY
The patient was admitted on day of surgery and underwent a laparoscopic sleeve gastrectomy & 1ry ventral hernia repair. Surgery went well and the patient went to our post-op bariatric floor. Overnight, the patient had stable vitals signs, good urine output and ambulated in the halls. On POD #1 the patient underwent an UGI which revealed no leak or obstruction. Phase I diet was started and the patient tolerated well. The patient has no N/V, tolerating diet, ambulating and pain controlled on oral medication. The patient was discharged home today in stable condition. The patient will f/u in 2 weeks and was given dietary and ambulation instruction. The patient was instructed to call if there are any questions or concerns.      Patient Active Problem List   Diagnosis     (spontaneous vaginal delivery)    Post term pregnancy at 39 weeks gestation    Essential hypertension    Morbid obesity with BMI of 45.0-49.9, adult ([address]    Chronic GERD    Chronic midline low back pain with right-sided sciatica    Prediabetes    Vitamin D deficiency    Morbid obesity with BMI of 40.0-44.9, adult ([address]    Ventral hernia without obstruction or gangrene    S/P laparoscopic sleeve gastrectomy

## 2021-10-12 NOTE — PROGRESS NOTES
[facility]) Physicians   General & Laparoscopic Surgery  [employer] [facility] [employer]       Pt seen and examined    S/p laparoscopic sleeve gastrectomy & 1ry ventral hernia repair. The patient is ambulating in thomas. Pain is well controlled. There is no nausea and/or vomiting. There are no other complaints or questions. Vitals:    10/11/21 2000 10/11/21 2339 10/12/21 0130 10/12/21 0730   BP: 126/70 (!) 150/95 (!) 144/88 (!) 122/93   Pulse: 80 76 78 101   Resp: 18 16 16 16   Temp: 97.9 °F (36.6 °C) 98.1 °F (36.7 °C)  97.9 °F (36.6 °C)   TempSrc: Oral Oral  Oral   SpO2: 94% 97% 98% 97%   Weight:       Height:         Abdomen is soft, appropriately tender, incisions stable with no erythema. Breath sounds are clear bilaterally. Bowel sounds are hypoactive in all quadrants.     Data  CBC:   Lab Results   Component Value Date    WBC 16.0 10/12/2021    RBC 4.59 10/12/2021    HGB 13.4 10/12/2021    HCT 39.9 10/12/2021    MCV 86.8 10/12/2021    MCH 29.3 10/12/2021    MCHC 33.7 10/12/2021    RDW 13.7 10/12/2021     10/12/2021    MPV 7.5 10/12/2021     BMP:    Lab Results   Component Value Date     10/12/2021    K 4.3 10/12/2021     10/12/2021    CO2 19 10/12/2021    BUN 5 10/12/2021    LABALBU 4.3 10/05/2021    CREATININE 0.6 10/12/2021    CALCIUM 8.7 10/12/2021    GFRAA >60 10/12/2021    GFRAA >60 08/22/2010    LABGLOM >60 10/12/2021    GLUCOSE 91 10/12/2021     Current Inpatient Medications    Current Facility-Administered Medications: lactated ringers infusion, , IntraVENous, Continuous  0.9 % sodium chloride infusion, 25 mL, IntraVENous, PRN  albuterol sulfate  (90 Base) MCG/ACT inhaler 2 puff, 2 puff, Inhalation, Q6H PRN  diphenhydrAMINE (BENADRYL) injection 12.5 mg, 12.5 mg, IntraVENous, Q6H PRN  hydrALAZINE (APRESOLINE) injection 10 mg, 10 mg, IntraVENous, Q2H PRN  hyoscyamine (LEVSIN) 500 MCG/ML injection 250 mcg, 250 mcg, IntraVENous, Q4H PRN  labetalol (NORMODYNE;TRANDATE) injection 10 mg, 10 mg, IntraVENous, Q2H PRN  lidocaine 4 % external patch 1 patch, 1 patch, TransDERmal, Daily  [START ON 10/14/2021] scopolamine (TRANSDERM-SCOP) transdermal patch 1 patch, 1 patch, TransDERmal, Q72H  HYDROmorphone HCl (DILAUDID) 0.2 mg/mL PCA 50 mL, , IntraVENous, Continuous  naloxone (NARCAN) injection 0.4 mg, 0.4 mg, IntraVENous, PRN  sodium chloride flush 0.9 % injection 10 mL, 10 mL, IntraVENous, 2 times per day  sodium chloride flush 0.9 % injection 10 mL, 10 mL, IntraVENous, PRN  HYDROmorphone HCl PF (DILAUDID) injection 0.5 mg, 0.5 mg, IntraVENous, Q3H PRN  ondansetron (ZOFRAN) injection 4 mg, 4 mg, IntraVENous, Q6H PRN  promethazine (PHENERGAN) injection 6.25 mg, 6.25 mg, IntraMUSCular, Q6H PRN  metoclopramide (REGLAN) injection 10 mg, 10 mg, IntraVENous, Q6H PRN  pantoprazole (PROTONIX) injection 40 mg, 40 mg, IntraVENous, Daily **AND** sodium chloride (PF) 0.9 % injection 10 mL, 10 mL, IntraVENous, Daily  enoxaparin (LOVENOX) injection 30 mg, 30 mg, SubCUTAneous, 2 times per day    Patient Active Problem List   Diagnosis     (spontaneous vaginal delivery)    Post term pregnancy at 39 weeks gestation    Essential hypertension    Morbid obesity with BMI of 45.0-49.9, adult ([facility]    Chronic GERD    Chronic midline low back pain with right-sided sciatica    Prediabetes    Vitamin D deficiency    Morbid obesity with BMI of 40.0-44.9, adult ([facility]    Ventral hernia without obstruction or gangrene    S/P laparoscopic sleeve gastrectomy     A/P  Natalia Stock is a 33 y.o. female pod#1, s/p laparoscopic sleeve gastrectomy & 1ry ventral hernia repair. Stable overall. UGI with no leak/obstruction, will start on Phase I diet. Patient has voided postoperatively and urine output is WNL. Will continue to monitor. Will discontinue PCA and start oral pain medications. Will restart patient's home dose of Norvasc.   Patient will continue to wear abdominal binder when walking for support. Patient needs to ambulate in the thomas every 60-90 minutes. Patient will continue icing incisions. Plan for discharge today if nausea remains controlled, patient continues to void and is tolerating Phase I diet. Discussed with Dr. Monet Santiago and Nursing Staff.     Evonne Erickson, JOCELYN-C

## 2021-10-12 NOTE — PROGRESS NOTES
Complaining of nausea after 90cc of water in the last 60 minutes. Given zofran IV and advised to stop drinking water for 30 minutes then to resume again.

## 2021-10-12 NOTE — PROGRESS NOTES
Incentive Spirometry education and demonstration completed by Respiratory Therapy Yes      Response to education: Very Good     Teaching Time: 5 minutes    Minimum Predicted Vital Capacity - 452 mL. Patient's Actual Vital Capacity - 1500 mL. Turning over to Nursing for routine follow-up Yes.     Comments: continue spirometer q2h w/a    Electronically signed by Praveena Louis RCP on 10/11/2021 at 8:30 PM

## 2021-10-12 NOTE — PROGRESS NOTES
Awake alert, oriented x4. Lap sites to abdomen x4 well approximated with surgical glue. Lap site at umbilicus well approximated with surgical glue. Small dressing to umbilicus site with old sanguineous drainage. No new drainage noted. Abdominal binder in place. Plan of care discussed. Up to bathroom and to walk in halls.

## 2021-10-19 ENCOUNTER — TELEPHONE (OUTPATIENT)
Dept: BARIATRICS/WEIGHT MGMT | Age: 31
End: 2021-10-19

## 2021-10-19 NOTE — TELEPHONE ENCOUNTER
Surgery Type: Sleeve and Hernia    Surgery Date: 10/11/21    Surgeon: Dr. Chance Collins    The patient was contacted to follow up on their recent bariatric surgery. The following topics were reviewed:    [] Hydration is Adequate-35 ounces yesterday. Mostly poweraide zero, propel. Just received Protein 2-O.            --Patient is getting at least 48-64 oz of fluids a day, not including protein shakes. [x]Consuming Adequate Protein- Mixed with 8-10oz of water         [x]Consuming 2 protein shakes a day                [x]Consuming 60-80 grams of protein a day    [] Food intake is appropriate- egg salad, chili (pureed), ricotta bake  [x]Adequately pureeing foods, so that there are no chunks left. []Taking in 1-2 oz at a time- 3-4 oz per sitting. Advised her to stick with max of 2oz per sitting. [x] Eating 4-6 times a day- 2x yesterday  [x] Following the 30-30-30 rule  [] Reminded patient to keep food diary to bring to their 2 week follow up appointment. [x] Pain relief techniques utilized- left sided incisional pain  [] Taking pain medication as prescribed- uses minimally, makes her tired  [] Utilizing Lidoderm patches (if prescribed)- she has them, recommended use  [x]Taking Tylenol instead of prescription pain medication  [x] Wearing abdominal binder  [x] Using ice for incisional pain    [x] Activity is appropriate  [x] Walking 10 minutes out of every hour  [x]Avoiding heavy lifting (>10lbs)  [] Utilizing their incentive spirometer- recommended use a few times    [] Issues with Nausea/Vomiting/Reflux  [] Using Zofran PRN for nausea- no issues   []Taking Pepcid for reflux- no issues    [] Issues with Constipation- BM since surgery  []Tried Colace  []Tried Miralax    All questions and concerns addressed. We discussed priorities of intakes 1- fluids, 2- protein shakes, 3- pureed foods. She will focus on fluids and protein shakes for today, once meeting those goals she will reintroduce pureed foods.

## 2021-10-28 ENCOUNTER — OFFICE VISIT (OUTPATIENT)
Dept: BARIATRICS/WEIGHT MGMT | Age: 31
End: 2021-10-28

## 2021-10-28 VITALS
RESPIRATION RATE: 18 BRPM | DIASTOLIC BLOOD PRESSURE: 76 MMHG | BODY MASS INDEX: 38.76 KG/M2 | HEIGHT: 64 IN | OXYGEN SATURATION: 99 % | HEART RATE: 113 BPM | SYSTOLIC BLOOD PRESSURE: 110 MMHG | WEIGHT: 227 LBS

## 2021-10-28 DIAGNOSIS — Z98.84 S/P LAPAROSCOPIC SLEEVE GASTRECTOMY: Primary | ICD-10-CM

## 2021-10-28 PROCEDURE — 99024 POSTOP FOLLOW-UP VISIT: CPT | Performed by: SURGERY

## 2021-10-28 NOTE — PROGRESS NOTES
Dietary Assessment Note      Vitals:   Vitals:    10/28/21 1035   Resp: 18   Height: 5' 4\" (1.626 m)    Patient lost 37 lbs over 5 weeks. Total Weight Loss: 37.8 lbs    Labs reviewed:     Results for Presley Tapia (MRN [id number]) as of 10/28/2021 10:36   Ref. Range 10/12/2021 05:16   Sodium Latest Ref Range: 136 - 145 mmol/L 134 (L)   Potassium Latest Ref Range: 3.5 - 5.1 mmol/L 4.3   Chloride Latest Ref Range: 99 - 110 mmol/L 103   CO2 Latest Ref Range: 21 - 32 mmol/L 19 (L)   BUN Latest Ref Range: 7 - 20 mg/dL 5 (L)   Creatinine Latest Ref Range: 0.6 - 1.1 mg/dL 0.6   Anion Gap Latest Ref Range: 3 - 16  12   GFR Non- Latest Ref Range: >60  >60   GFR  Latest Ref Range: >60  >60   Glucose Latest Ref Range: 70 - 99 mg/dL 91   Calcium Latest Ref Range: 8.3 - 10.6 mg/dL 8.7       Protein intake: 60-80 grams/day     Fluid intake: 38-40 oz/day     Multivitamin/mineral intake: 4 fusion     Calcium intake: none     Other: none     Exercise: Walking     Nutrition Assessment: 2 week s/p sleeve post-op visit. Breakfast: Nectar shake made with water (6 oz)     Snack: FF refried beans OR LF ricotta cheese with SF sauce     Lunch: Nectar shake made with water (6 oz)     Snack: FF refried beans OR LF ricotta cheese with SF sauce     Dinner: Nectar shake made with water (6 oz)     Snack: nothing     Fluids: Water, [id number] (<1 Bottle per day), propel    Consuming only full liquid/Pureed foods?  Yes     Amount able to eat per sitting: Was eating 3-4 oz at 1 week call but dropped this to 1 oz/sitting     Following 30/30/30 rule: Yes     Food Intolerances/issues: ROSIE [place]     Client Concerns: dry mouth    Goals:   Start Phase 3 at 3 weeks post op - handout provided and reviewed   Increase fluids to at least 48 oz per day   Drop to 2 shakes per day but ensure she is reaching at least 60 grams of protein     Plan: F/U at 6 weeks     Becca Reyes RD, LD

## 2021-10-28 NOTE — PROGRESS NOTES
The patient is a 33 y.o. female who returns today for follow up.    Lenda Members is s/p     Laparoscopic sleeve gastrectomy    We discussed how her weight affects her overall health including:  Patient Active Problem List   Diagnosis     (spontaneous vaginal delivery)    Post term pregnancy at 39 weeks gestation    Essential hypertension    Chronic GERD    Chronic midline low back pain with right-sided sciatica    Prediabetes    Vitamin D deficiency    Morbid obesity with BMI of 40.0-44.9, adult ([address]    Ventral hernia without obstruction or gangrene    S/P laparoscopic sleeve gastrectomy        Vitals:    10/28/21 1035   BP: 110/76   Pulse: 113   Resp: 18   SpO2: 99%   Weight: 227 lb (103 kg)   Height: 5' 4\" (1.626 m)       Lab Results   Component Value Date    WBC 16.0 10/12/2021    RBC 4.59 10/12/2021    HGB 13.4 10/12/2021    HCT 39.9 10/12/2021    MCV 86.8 10/12/2021    MCH 29.3 10/12/2021    MCHC 33.7 10/12/2021    MPV 7.5 10/12/2021    NEUTOPHILPCT 59.3 2021    LYMPHOPCT 23.8 2021    MONOPCT 8.2 2021    EOSRELPCT 8.0 2021    BASOPCT 0.7 2021    NEUTROABS 4.7 2021    LYMPHSABS 1.9 2021    MONOSABS 0.7 2021    EOSABS 0.6 2021     Lab Results   Component Value Date     10/12/2021    K 4.3 10/12/2021     10/12/2021    CO2 19 10/12/2021    ANIONGAP 12 10/12/2021    GLUCOSE 91 10/12/2021    BUN 5 10/12/2021    CREATININE 0.6 10/12/2021    LABGLOM >60 10/12/2021    GFRAA >60 10/12/2021    GFRAA >60 2010    CALCIUM 8.7 10/12/2021    PROT 6.9 10/05/2021    PROT 5.9 2010    LABALBU 4.3 10/05/2021    AGRATIO 1.7 10/05/2021    BILITOT 0.5 10/05/2021    ALKPHOS 59 10/05/2021    ALT 18 10/05/2021    AST 18 10/05/2021    GLOB 2.6 10/05/2021     Lab Results   Component Value Date    CHOL 158 2021    TRIG 130 2021    HDL 42 2021    LDLCALC 90 2021    LABVLDL 26 2021     Lab Results   Component Value Date    TSHREFLEX 1.59 04/07/2021     Lab Results   Component Value Date    IRON 115 04/07/2021    TIBC 272 04/07/2021    LABIRON 42 04/07/2021     Lab Results   Component Value Date    RUCSWDLJ33 1173 04/07/2021    FOLATE 17.52 04/07/2021     Lab Results   Component Value Date    VITD25 18.0 04/07/2021     Lab Results   Component Value Date    LABA1C 5.7 04/07/2021    .9 04/07/2021        The patient's current Body mass index is 38.96 kg/m². (10/28/21). Since her last visit she has lost 37 lbs since last visit and total of 38 lbs. Carl Deleon underwent dietary counseling, and I have reviewed and agree with the dietary counseling, and I have reviewed and agree with the diet plan. There are no changes in the patients medical history or physical exam.     Denies nausea, vomiting, fevers, chills, hiccups, shoulder pain, heartburn, dysphagia wound drainage/bulge nor change in color around incision. Bowels working ok and making urine. The incisions healing well. Overall I'm really pleased with Carl Deleon recovery. Pathology results were discussed with the patient. Carl Deleon advised to sign  release form for utilizing the 3 months complimentary membership in the [address] [place] [address] starting after 6 weeks post op. I did explain thoroughly to the patient that compliance with  post op diet and other recommendations are integral part to improve the chances of successful weight loss and also not following it could end with serious health complications. I advised Carl Deleon to gradually advance activity and  to call if there are any questions or concerns. Carl Deleon will follow up in 4 weeks. Please note that some or all of this report was generated using voice recognition software. Please notify me in case of any questions about the content of this document, as some errors in transcription may have occurred .

## 2021-11-12 ENCOUNTER — HOSPITAL ENCOUNTER (OUTPATIENT)
Dept: ULTRASOUND IMAGING | Age: 31
Discharge: HOME | End: 2021-11-12
Payer: MEDICAID

## 2021-11-12 DIAGNOSIS — R59.0 ENLARGED LYMPH NODES IN ARMPIT: ICD-10-CM

## 2021-11-12 PROCEDURE — 76882 US LMTD JT/FCL EVL NVASC XTR: CPT

## 2021-11-19 ASSESSMENT — ENCOUNTER SYMPTOMS
ROS SKIN COMMENTS: ABDOMINAL SURGICAL INCISIONS.
ALLERGIC/IMMUNOLOGIC NEGATIVE: 1
RESPIRATORY NEGATIVE: 1
GASTROINTESTINAL NEGATIVE: 1
EYES NEGATIVE: 1

## 2021-11-19 NOTE — PATIENT INSTRUCTIONS
Diet tips to help make you successful postoperatively    Eating habits after surgery will need to be a long-term change. Eating habits are so ingrained that it can be difficult to change so having made these changes for surgery is a great accomplishment. It is important to maintain these new eating habits after surgery. Also, remember that overall health, age, and genetics make each person's weight loss progress different. Do not compare your progress, the amount you eat, or exercise to other patients.  Protein first at every meal- Eat the protein portion of your meal first. Eating protein helps the body feel full and sends a signal to stop eating. Protein is very important in building tissue in the body.  Eat at least 4 times per day- This includes protein supplements and small meals with a high amount of protein   Chewing your food thoroughly- Eating too quickly and improper chewing can cause pain and vomiting after surgery.  Slowing down the speed at which you eat- Refill your fork only after you swallow. Adopt a new pattern of eating by taking a bite of food and putting your utensil down between bites. This will help to reduce the feeling of food being stuck.    Drink water and other fluids slowly- Drink at least 48 ounces per day minimum. Sip fluids as if they were hot beverages. If you find it difficult to stop gulping liquids, try using a sippy cup or a sport top water bottle.  Make sure you are eating meals without drinking fluids- After surgery you will not be allowed to drink fluids 30 minutes before, during, or 30 minutes after your meal (30/30/30 rule). This will be a life-long behavior change. The reason for the rule is to keep food from passing through your smaller stomach more rapidly.  This will cause you to feel hungry again shortly after your meal.   Continue to avoid caffeine and carbonated beverages- Caffeine acts as a diuretic and can be dehydrating as well as irritating to the lining of the stomach. Carbonated beverages release gas and can expand the stomach.  Continue to keep temptation from your kitchen- Keep your pantry and kitchen cabinets cleaned out of those dangerous foods that might tempt you after surgery (chips, cookies, candy, etc.).  Continue to increase your exercise program- Increase your daily physical activity. Aim for 5-6 days per week for 30 minutes. Walking is an easy way to get started with exercising. You want exercise to be a regular part of your life after surgery.  Make sure you have a good support system- There will be many changes and adjustments to make after surgery. It is important to have a supportive friend, family member or co-worker, etc. with whom you can talk. Continue to attend [facility]) Weight Management support groups as they can be helpful in maintaining behaviors. In addition, it is the responsibility of the patient to schedule and follow up on labs and tests completed after surgery. Results will be reviewed at each visit. Patient received dietary handouts and education.     Goals:   - Move to phase 4 diet guidelines  - Limit protein shake / protein peña to 3 per day

## 2021-11-19 NOTE — PROGRESS NOTES
[facility]) Physicians   [employer]    12/1/2021    TELEHEALTH EVALUATION -- Audio/Visual    Subjective:      Patient ID: Efra Cervantes is a 33 y.o. female    HPI    7 weeks s/p sleeve gastrectomy    Efra Cervantes is a 33 y.o. obese female , Body mass index is 37.42 kg/m². Due to the COVID-19 restrictions on close contact interactions the patient's visit was conducted via audio/video in yasmeen of a face to face visit. Patient has consented to have this visit conducted via audio/video and I am conducting it from the office. The patient is here through telemedicine for their post op bariatric surgery visit. Patient denies any nausea, vomiting, fevers, chills, shortness of breath, chest pain, constipation or urinary symptoms. Denies any heartburn nor dysphagia. Past Medical History:   Diagnosis Date    Asthma     associated with seasonal allergies    Hypertension     Pruritic urticarial papules and plaques of pregnancy      Past Surgical History:   Procedure Laterality Date    SLEEVE GASTRECTOMY N/A 10/11/2021    LAPAROSCOIC SLEEVE GASTRECTOMY AND VENTRAL HERNIA REPAIR performed by Sheila Garcia MD at [address]  [facility] ENDOSCOPY N/A 4/23/2021    EGD BIOPSY performed by Sheila Garcia MD at [id number] [facility] ENDOSCOPY     Family History   Adopted: Yes     Social History     Tobacco Use    Smoking status: Never Smoker    Smokeless tobacco: Never Used   Substance Use Topics    Alcohol use: Not Currently     Comment: socially      I counseled the patient on the importance of not smoking and risks of ETOH. Allergies   Allergen Reactions    Other      Cats and Dogs     Vitals:    12/01/21 0930   Weight: 218 lb (98.9 kg)   Height: 5' 4\" (1.626 m)     Body mass index is 37.42 kg/m².     Current Outpatient Medications:     budesonide-formoterol (SYMBICORT) 160-4.5 MCG/ACT AERO, Inhale 2 puffs into the lungs daily, Disp: , Rfl:     montelukast (SINGULAIR) 5 MG chewable tablet, Take 5 mg by mouth nightly, Disp: , Rfl:     amLODIPine (NORVASC) 10 MG tablet, daily , Disp: , Rfl:     albuterol sulfate HFA (PROVENTIL HFA) 108 (90 Base) MCG/ACT inhaler, Inhale 2 puffs into the lungs every 4 hours as needed for Wheezing, Disp: 1 Inhaler, Rfl: 1    Lab Results   Component Value Date    WBC 16.0 10/12/2021    RBC 4.59 10/12/2021    HGB 13.4 10/12/2021    HCT 39.9 10/12/2021    MCV 86.8 10/12/2021    MCH 29.3 10/12/2021    MCHC 33.7 10/12/2021    MPV 7.5 10/12/2021    NEUTOPHILPCT 59.3 04/07/2021    LYMPHOPCT 23.8 04/07/2021    MONOPCT 8.2 04/07/2021    EOSRELPCT 8.0 04/07/2021    BASOPCT 0.7 04/07/2021    NEUTROABS 4.7 04/07/2021    LYMPHSABS 1.9 04/07/2021    MONOSABS 0.7 04/07/2021    EOSABS 0.6 04/07/2021     Lab Results   Component Value Date     10/12/2021    K 4.3 10/12/2021     10/12/2021    CO2 19 10/12/2021    ANIONGAP 12 10/12/2021    GLUCOSE 91 10/12/2021    BUN 5 10/12/2021    CREATININE 0.6 10/12/2021    LABGLOM >60 10/12/2021    GFRAA >60 10/12/2021    GFRAA >60 08/22/2010    CALCIUM 8.7 10/12/2021    PROT 6.9 10/05/2021    PROT 5.9 08/22/2010    LABALBU 4.3 10/05/2021    AGRATIO 1.7 10/05/2021    BILITOT 0.5 10/05/2021    ALKPHOS 59 10/05/2021    ALT 18 10/05/2021    AST 18 10/05/2021    GLOB 2.6 10/05/2021     Lab Results   Component Value Date    CHOL 158 04/07/2021    TRIG 130 04/07/2021    HDL 42 04/07/2021    LDLCALC 90 04/07/2021    LABVLDL 26 04/07/2021     Lab Results   Component Value Date    TSHREFLEX 1.59 04/07/2021     Lab Results   Component Value Date    IRON 115 04/07/2021    TIBC 272 04/07/2021    LABIRON 42 04/07/2021     Lab Results   Component Value Date    PZAZOYZK87 1173 04/07/2021    FOLATE 17.52 04/07/2021     Lab Results   Component Value Date    VITD25 18.0 04/07/2021     Lab Results   Component Value Date    LABA1C 5.7 04/07/2021    .9 04/07/2021       Review of Systems   Constitutional: Negative.     HENT: Negative. Eyes: Negative. Respiratory: Negative. Cardiovascular: Negative. Gastrointestinal: Negative. Endocrine: Negative. Genitourinary: Negative. Musculoskeletal: Negative. Skin:        Abdominal surgical incisions. Allergic/Immunologic: Negative. Neurological: Negative. Hematological: Negative. Psychiatric/Behavioral: Negative. PHYSICAL EXAMINATION:    Constitutional: [x] Appears well-developed and well-nourished [x] No apparent distress      [] Abnormal-   Mental status  [x] Alert and awake  [x] Oriented to person/place/time [x]Able to follow commands      Eyes:  EOM    [x]  Normal  [] Abnormal-  Sclera  [x]  Normal  [] Abnormal -         Discharge [x]  None visible  [] Abnormal -    HENT:   [x] Normocephalic, atraumatic. [] Abnormal     Neck: [x] No visualized mass     Pulmonary/Chest: [x] Respiratory effort normal.  [x] No visualized signs of difficulty breathing or respiratory distress        [] Abnormal-      Musculoskeletal:   [] Normal gait with no signs of ataxia         [x] Normal range of motion of neck        [] Abnormal-     Neurological:        [x] No Facial Asymmetry (Cranial nerve 7 motor function) (limited exam to video visit)          [x] No gaze palsy        [] Abnormal-         Skin:        [x] No significant exanthematous lesions or discoloration noted on facial skin                                 [x] Surgical abdominal incisions well healed. [] Abnormal-            Psychiatric:       [x] Normal Affect [] No Hallucinations        [] Abnormal-     Other pertinent observable physical exam findings-     Due to this being a TeleHealth encounter, evaluation of the following organ systems is limited: Vitals/Constitutional/EENT/Resp/CV/GI//MS/Neuro/Skin/Heme-Lymph-Imm. Assessment and Plan:   Patient is here via telemedicine and is 7 weeks s/p sleeve, down 9 lbs with a total weight loss of 46.8 lbs.  The patient's current Body mass index is 37.42 kg/m². (12/1/21). She is doing well,denies n/v/dysphagia or reflux. She is tolerating diet, getting adequate fluids and protein. Bowels and bladder functioning. She is taking vitamins as instructed. She did speak with the registered dietitian for continued follow up. Discussed with dietitian and I agree with recommendations and plan. She is exercising with walking. Encouraged her to continue physical activity and the importance of exercise and weight loss. Incisions healing well. No lifting restrictions, but [facility] after the beginning of the year. We will see her back in 9 weeks for continued follow up or via telemedicine depending on COVID-19 restrictions at the time of their next appointment. A total of 15 minutes was spent conversing with the patient and over half of that time was spent counseling the patient on proper dietary behaviors, exercise and post-op progress. An electronic signature was used to authenticate this note. This Virtual  Visit was conducted, with patient's consent, to reduce the patient's risk of exposure to COVID-19 and provide continuity of care for an established patient. Services were provided through a video synchronous discussion virtually to substitute for in-person clinic visit.

## 2021-12-01 ENCOUNTER — TELEMEDICINE (OUTPATIENT)
Dept: BARIATRICS/WEIGHT MGMT | Age: 31
End: 2021-12-01

## 2021-12-01 VITALS — WEIGHT: 218 LBS | BODY MASS INDEX: 37.22 KG/M2 | HEIGHT: 64 IN

## 2021-12-01 DIAGNOSIS — K21.9 CHRONIC GERD: ICD-10-CM

## 2021-12-01 DIAGNOSIS — E66.01 SEVERE OBESITY (BMI 35.0-35.9 WITH COMORBIDITY) (HCC): ICD-10-CM

## 2021-12-01 DIAGNOSIS — Z98.84 S/P LAPAROSCOPIC SLEEVE GASTRECTOMY: ICD-10-CM

## 2021-12-01 DIAGNOSIS — R73.03 PREDIABETES: ICD-10-CM

## 2021-12-01 DIAGNOSIS — I10 ESSENTIAL HYPERTENSION: Primary | ICD-10-CM

## 2021-12-01 PROCEDURE — 99024 POSTOP FOLLOW-UP VISIT: CPT | Performed by: NURSE PRACTITIONER

## 2021-12-01 NOTE — PROGRESS NOTES
Dietary Assessment Note  Vitals:   Vitals:    12/01/21 0930   Weight: 218 lb (98.9 kg)   Height: 5' 4\" (1.626 m)   Patient lost 9 lbs over past ~4 weeks, per pt report. Total Weight Loss: 46.8 lbs    Labs reviewed: no new labs    Protein intake: 60-80 grams/day - 3 gatorade zero protein / 3 protein shake (w/ 8-10oz FF)    Fluid intake: >64 oz/day - water / gatorade protein water    Multivitamin/mineral intake: yes - 4 fusion    Calcium intake: no    Other: hair-skin-nails    Exercise: walking, no strenuous exercise    Nutrition Assessment: 6 week post-op visit. B- egg white OR protein shake  L- tuna OR chicken salad OR CC OR lunch meat & string cheese  S- CC OR string cheese  D- protein shake OR 1/2 turkey burger  S- string cheese    Amount able to eat per sitting: >1/2 cup volume    Following 30/30/30 rule: yes    Food Intolerances/issues: none    Client Concerns: none    Goals:   - Move to phase 4 diet guidelines  - Limit protein shake / protein peña to 3 per day    Plan: f/u as directed    Due to the COVID-19 restrictions on close contact interactions the patient's visit was conducted via telephone in yasmeen of a face to face visit. The patient is here through telemedicine for their post-op visit.     Fredy Rodrigues RD, LD

## 2022-01-20 ENCOUNTER — OFFICE VISIT (OUTPATIENT)
Dept: BARIATRICS/WEIGHT MGMT | Age: 32
End: 2022-01-20
Payer: MEDICAID

## 2022-01-20 VITALS
BODY MASS INDEX: 36.19 KG/M2 | RESPIRATION RATE: 18 BRPM | HEART RATE: 77 BPM | DIASTOLIC BLOOD PRESSURE: 76 MMHG | WEIGHT: 212 LBS | HEIGHT: 64 IN | OXYGEN SATURATION: 98 % | SYSTOLIC BLOOD PRESSURE: 110 MMHG

## 2022-01-20 DIAGNOSIS — M54.41 CHRONIC MIDLINE LOW BACK PAIN WITH RIGHT-SIDED SCIATICA: ICD-10-CM

## 2022-01-20 DIAGNOSIS — K21.9 CHRONIC GERD: ICD-10-CM

## 2022-01-20 DIAGNOSIS — E66.01 SEVERE OBESITY (BMI 35.0-35.9 WITH COMORBIDITY) (HCC): Primary | ICD-10-CM

## 2022-01-20 DIAGNOSIS — I10 ESSENTIAL HYPERTENSION: ICD-10-CM

## 2022-01-20 DIAGNOSIS — Z98.84 S/P LAPAROSCOPIC SLEEVE GASTRECTOMY: ICD-10-CM

## 2022-01-20 DIAGNOSIS — G89.29 CHRONIC MIDLINE LOW BACK PAIN WITH RIGHT-SIDED SCIATICA: ICD-10-CM

## 2022-01-20 PROCEDURE — 99214 OFFICE O/P EST MOD 30 MIN: CPT | Performed by: SURGERY

## 2022-01-20 NOTE — PROGRESS NOTES
Dietary Assessment Note      Vitals:   Vitals:    22 0932   BP: 110/76   Pulse: 77   Resp: 18   SpO2: 98%   Weight: 212 lb (96.2 kg)   Height: 5' 4\" (1.626 m)    Patient lost 7 lbs over 6 weeks.     Total Weight Loss: 53.8 lbs    Labs reviewed: labs are reviewed, up to date and normal    Protein intake: 60-80 grams/day 2 protein shakes with milk     Fluid intake: >64 oz/day water gatorade zero    Multivitamin/mineral intake: yes fusion -4    Calcium intake: no    Other: hair skin nails    Exercise: yes walking less - [facility]    Nutrition Assessment:   breakfast protein shake in the morning with milk  Lunch will have tuna with wheat thins & cottage cheese at 11:30am  Protein shake at 3pm    Dinner fish and broccoli/asparagus at 6pm     Amount able to eat per sittin oz     Following 30 rule: yes     Food Intolerances/issues: none    Client Concerns: none    Goals: decrease to 1 shake per day and incorp produce with meals/snacks      Elia Reed, RD, LD

## 2022-01-20 NOTE — PROGRESS NOTES
[facility]) Physicians   [employer]  June Stafford MD, [address] [facility], [address] [place] [id number] . Phone: [phone]  Fax: [phone]            Chief Complaint   Patient presents with   [facility] Post Op Follow Up     14 wk s/p sleeve 10/11/21           HPI:    Garrick Toledo is a very pleasant 33 y.o.  female , Body mass index is 36.39 kg/m². Yoselin Brittle And multiple medical problems who is presenting for bariatric follow up care. Josue Lowe is s/p laparoscopic sleeve gastrectomy by me 10/2021. Initial Weight: 265 lbs, Weight Loss: 54 lbs. Comes today to the clinic without any complaints. Patient denies any nausea, vomiting, fevers, chills, shortness of breath, chest pain, constipation or urinary symptoms. Denies any heartburn nor dysphagia. Josue Lowe is feeling very well, and is very active. Patient is very pleased with the weight loss and resolution of co-morbid conditions. Pain Assessment   Denies any abdominal pain     Past Medical History:   Diagnosis Date    Asthma     associated with seasonal allergies    Hypertension     Pruritic urticarial papules and plaques of pregnancy      Past Surgical History:   Procedure Laterality Date    SLEEVE GASTRECTOMY N/A 10/11/2021    LAPAROSCOIC SLEEVE GASTRECTOMY AND VENTRAL HERNIA REPAIR performed by Jossie Lemus MD at [address] [facility] ENDOSCOPY N/A 4/23/2021    EGD BIOPSY performed by Jossie Lemus MD at [address] [facility] ENDOSCOPY     Family History   Adopted: Yes     Social History     Tobacco Use    Smoking status: Never Smoker    Smokeless tobacco: Never Used   Substance Use Topics    Alcohol use: Not Currently     Comment: socially      I counseled the patient on the importance of not smoking and risks of ETOH.    Allergies   Allergen Reactions    Other      Cats and Dogs     Vitals:    01/20/22 0932   BP: 110/76   Pulse: 77   Resp: 18   SpO2: 98%   Weight: 212 lb (96.2 kg) Height: 5' 4\" (1.626 m)       Body mass index is 36.39 kg/m².       Lab Results   Component Value Date    WBC 16.0 10/12/2021    RBC 4.59 10/12/2021    HGB 13.4 10/12/2021    HCT 39.9 10/12/2021    MCV 86.8 10/12/2021    MCH 29.3 10/12/2021    MCHC 33.7 10/12/2021    MPV 7.5 10/12/2021    NEUTOPHILPCT 59.3 04/07/2021    LYMPHOPCT 23.8 04/07/2021    MONOPCT 8.2 04/07/2021    EOSRELPCT 8.0 04/07/2021    BASOPCT 0.7 04/07/2021    NEUTROABS 4.7 04/07/2021    LYMPHSABS 1.9 04/07/2021    MONOSABS 0.7 04/07/2021    EOSABS 0.6 04/07/2021     Lab Results   Component Value Date     10/12/2021    K 4.3 10/12/2021     10/12/2021    CO2 19 10/12/2021    ANIONGAP 12 10/12/2021    GLUCOSE 91 10/12/2021    BUN 5 10/12/2021    CREATININE 0.6 10/12/2021    LABGLOM >60 10/12/2021    GFRAA >60 10/12/2021    GFRAA >60 08/22/2010    CALCIUM 8.7 10/12/2021    PROT 6.9 10/05/2021    PROT 5.9 08/22/2010    LABALBU 4.3 10/05/2021    AGRATIO 1.7 10/05/2021    BILITOT 0.5 10/05/2021    ALKPHOS 59 10/05/2021    ALT 18 10/05/2021    AST 18 10/05/2021    GLOB 2.6 10/05/2021     Lab Results   Component Value Date    CHOL 158 04/07/2021    TRIG 130 04/07/2021    HDL 42 04/07/2021    LDLCALC 90 04/07/2021    LABVLDL 26 04/07/2021     Lab Results   Component Value Date    TSHREFLEX 1.59 04/07/2021     Lab Results   Component Value Date    IRON 115 04/07/2021    TIBC 272 04/07/2021    LABIRON 42 04/07/2021     Lab Results   Component Value Date    [id number] 1173 04/07/2021    FOLATE 17.52 04/07/2021     Lab Results   Component Value Date    VITD25 18.0 04/07/2021     Lab Results   Component Value Date    LABA1C 5.7 04/07/2021    .9 04/07/2021         Current Outpatient Medications:     budesonide-formoterol (SYMBICORT) 160-4.5 MCG/ACT AERO, Inhale 2 puffs into the lungs daily, Disp: , Rfl:     montelukast (SINGULAIR) 5 MG chewable tablet, Take 5 mg by mouth nightly, Disp: , Rfl:     amLODIPine (NORVASC) 10 MG tablet, daily , Disp: , Rfl:     albuterol sulfate HFA (PROVENTIL HFA) 108 (90 Base) MCG/ACT inhaler, Inhale 2 puffs into the lungs every 4 hours as needed for Wheezing, Disp: 1 Inhaler, Rfl: 1      Review of Systems - History obtained from the patient  General ROS: negative  Psychological ROS: negative  Ophthalmic ROS: negative  Neurological ROS: negative  ENT ROS: negative  Allergy and Immunology ROS: negative  Hematological and Lymphatic ROS: negative  Endocrine ROS: negative  Breast ROS: negative  Respiratory ROS: negative  Cardiovascular ROS: negative  Gastrointestinal ROS:negative  Genito-Urinary ROS: negative  Musculoskeletal ROS: negative   Skin ROS: negative    Physical Exam   Vitals Reviewed   Constitutional: Patient is oriented to person, place, and time. Patient appears well-developed and well-nourished. Patient is active and cooperative. Non-toxic appearance. No distress. HENT:   Head: Normocephalic and atraumatic. Head is without abrasion and without laceration. Hair is normal.   Right Ear: External ear normal. No lacerations. No drainage, swelling . Left Ear: External ear normal. No lacerations. No drainage, swelling. Mouth / Nose: face mask in place  Eyes: Conjunctivae, EOM and lids are normal. Right eye exhibits no discharge. No foreign body present in the right eye. Left eye exhibits no discharge. No foreign body present in the left eye. No scleral icterus. Neck: Trachea normal and normal range of motion. No JVD present. Pulmonary/Chest: Effort normal. No accessory muscle usage or stridor. No apnea. No respiratory distress. Cardiovascular: Normal rate and no JVD. Abdominal: Normal appearance. Patient exhibits no distension. Abdomen is soft, obese, non tender. Musculoskeletal: Normal range of motion. Patient exhibits no edema. Neurological: Patient is alert and oriented to person, place, and time. Patient has normal strength. GCS eye subscore is 4. GCS verbal subscore is 5.  GCS motor subscore is 6.   Skin: Skin is warm and dry. No abrasion and no rash noted. Patient is not diaphoretic. No cyanosis or erythema. Psychiatric: Patient has a normal mood and affect. Speech is normal and behavior is normal. Cognition and memory are normal.       A/P:    Tru Ferrell was seen today for bariatrics post op follow up. Diagnoses and all orders for this visit:    Severe obesity (BMI 35.0-35.9 with comorbidity) (HCC)    Chronic midline low back pain with right-sided sciatica    Chronic GERD    Essential hypertension    S/P laparoscopic sleeve gastrectomy          Seun Barrera is 33 y.o. female , now with Body mass index is 36.39 kg/m². s/p Sleeve gastrectomy, has lost 7 lbs since last visit, total of 54 lbs weight loss. The patient underwent dietary counseling with registered dietician. I have reviewed, discussed and agree with the dietary plan. Patient is trying hard to keep good dietary and behavior modifications. Patient is monitoring portion sizes, food choices and liquid calories. Patient is trying to exercise regularly. Patient pleased with the surgery outcomes. I encouraged the patient to continue exercise and keeping healthy eating habits. Also counseled the patient extensively on post surgery care. Total encounter time: 31 minutes, including any number of the following: Bariatric Post operative work up/protocols, review of labs, imaging, provider notes, outside hospital records, performing examination/evaluation, counseling patient and/or family, ordering medications/tests, placing referrals and communication with referring physicians, coordination of care; discussing dietary plan/recall with the patient as well with registered dietitian and documentation in the EHR. Of note, the above was done during same day of the actual patient encounter. Tru Ferrell is here for her 14-week status post sleeve gastrectomy. Patient doing very well. Patient has no complaints.   Patient is very pleased with her weight loss. Patient is ready to [facility] in the [facility]. We will see the patient back in 2 months for continued follow-up. Obesity as a disease is considered a high risk to patients overall health and should therefore be considered a high risk disease state. Advised the patient that not getting there weight under control, which hopefully would help with getting some of the comorbidities under control. That could increase risk of complications/worsening of those conditions on the long-term. Now with Covid-19 pandemic, CDC and health authorities does classify obese patients as vulnerable and high risk as well. Which makes weight loss a priority for improvement of their wellbeing and overall health. We discussed how her excess weight affects her overall health and importance of weight loss, healthy diet and active lifestyle to alleviate those co morbid conditions, otherwise risk deterioration.       - RTC in 2 months           Patient advised that its their responsibility to follow up for care, studies, referrals and/or labs ordered today. Please note that some or all of this report was generated using voice recognition software. Please notify me in case of any questions about the content of this document, as some errors in transcription may have occurred .

## 2022-03-24 ENCOUNTER — OFFICE VISIT (OUTPATIENT)
Dept: BARIATRICS/WEIGHT MGMT | Age: 32
End: 2022-03-24
Payer: MEDICAID

## 2022-03-24 VITALS
HEART RATE: 91 BPM | OXYGEN SATURATION: 98 % | BODY MASS INDEX: 35.51 KG/M2 | DIASTOLIC BLOOD PRESSURE: 82 MMHG | WEIGHT: 208 LBS | HEIGHT: 64 IN | SYSTOLIC BLOOD PRESSURE: 123 MMHG | RESPIRATION RATE: 18 BRPM

## 2022-03-24 DIAGNOSIS — Z98.84 S/P LAPAROSCOPIC SLEEVE GASTRECTOMY: Primary | ICD-10-CM

## 2022-03-24 DIAGNOSIS — I10 ESSENTIAL HYPERTENSION: ICD-10-CM

## 2022-03-24 DIAGNOSIS — K21.9 CHRONIC GERD: ICD-10-CM

## 2022-03-24 DIAGNOSIS — R73.03 PREDIABETES: ICD-10-CM

## 2022-03-24 PROCEDURE — 1036F TOBACCO NON-USER: CPT | Performed by: SURGERY

## 2022-03-24 PROCEDURE — G8427 DOCREV CUR MEDS BY ELIG CLIN: HCPCS | Performed by: SURGERY

## 2022-03-24 PROCEDURE — 99214 OFFICE O/P EST MOD 30 MIN: CPT | Performed by: SURGERY

## 2022-03-24 PROCEDURE — G8484 FLU IMMUNIZE NO ADMIN: HCPCS | Performed by: SURGERY

## 2022-03-24 PROCEDURE — G8417 CALC BMI ABV UP PARAM F/U: HCPCS | Performed by: SURGERY

## 2022-03-24 NOTE — PROGRESS NOTES
[facility]) Physicians   [employer]  Kelby Lee MD, [address] [facility], [address] [place] [address] . Phone: [phone]  Fax: [phone]            Chief Complaint   Patient presents with   [facility] Post Op Follow Up     5mo s/p sleeve 10/11/21           HPI:    Jesse Ibarra is a very pleasant 33 y.o.  female , Body mass index is 35.7 kg/m². [employer] And multiple medical problems who is presenting for bariatric follow up care. Alverto doss is s/p laparoscopic sleeve gastrectomy by me 10/2021. Initial Weight: 265 lbs, Weight Loss: 58 lbs. Comes today to the clinic without any complaints. Patient denies any nausea, vomiting, fevers, chills, shortness of breath, chest pain, constipation or urinary symptoms. Denies any heartburn nor dysphagia. Alverto doss is feeling very well, and is very active. Patient is very pleased with the weight loss and resolution of co-morbid conditions. Pain Assessment   Denies any abdominal pain     Past Medical History:   Diagnosis Date    Asthma     associated with seasonal allergies    Hypertension     Pruritic urticarial papules and plaques of pregnancy      Past Surgical History:   Procedure Laterality Date    SLEEVE GASTRECTOMY N/A 10/11/2021    LAPAROSCOIC SLEEVE GASTRECTOMY AND VENTRAL HERNIA REPAIR performed by Gurinder Butt MD at [address] [facility] ENDOSCOPY N/A 4/23/2021    EGD BIOPSY performed by Gurinder Butt MD at [address] [facility] ENDOSCOPY     Family History   Adopted: Yes     Social History     Tobacco Use    Smoking status: Never Smoker    Smokeless tobacco: Never Used   Substance Use Topics    Alcohol use: Not Currently     Comment: socially      I counseled the patient on the importance of not smoking and risks of ETOH.    Allergies   Allergen Reactions    Other      Cats and Dogs     Vitals:    03/24/22 0952   BP: 123/82   Pulse: 91   Resp: 18   SpO2: 98%   Weight: 208 lb (94.3 kg) Height: 5' 4\" (1.626 m)       Body mass index is 35.7 kg/m².       Lab Results   Component Value Date    WBC 16.0 10/12/2021    RBC 4.59 10/12/2021    HGB 13.4 10/12/2021    HCT 39.9 10/12/2021    MCV 86.8 10/12/2021    MCH 29.3 10/12/2021    MCHC 33.7 10/12/2021    MPV 7.5 10/12/2021    NEUTOPHILPCT 59.3 04/07/2021    LYMPHOPCT 23.8 04/07/2021    MONOPCT 8.2 04/07/2021    EOSRELPCT 8.0 04/07/2021    BASOPCT 0.7 04/07/2021    NEUTROABS 4.7 04/07/2021    LYMPHSABS 1.9 04/07/2021    MONOSABS 0.7 04/07/2021    EOSABS 0.6 04/07/2021     Lab Results   Component Value Date     10/12/2021    K 4.3 10/12/2021     10/12/2021    CO2 19 10/12/2021    ANIONGAP 12 10/12/2021    GLUCOSE 91 10/12/2021    BUN 5 10/12/2021    CREATININE 0.6 10/12/2021    LABGLOM >60 10/12/2021    GFRAA >60 10/12/2021    GFRAA >60 08/22/2010    CALCIUM 8.7 10/12/2021    PROT 6.9 10/05/2021    PROT 5.9 08/22/2010    LABALBU 4.3 10/05/2021    AGRATIO 1.7 10/05/2021    BILITOT 0.5 10/05/2021    ALKPHOS 59 10/05/2021    ALT 18 10/05/2021    AST 18 10/05/2021    GLOB 2.6 10/05/2021     Lab Results   Component Value Date    CHOL 158 04/07/2021    TRIG 130 04/07/2021    HDL 42 04/07/2021    LDLCALC 90 04/07/2021    LABVLDL 26 04/07/2021     Lab Results   Component Value Date    TSHREFLEX 1.59 04/07/2021     Lab Results   Component Value Date    IRON 115 04/07/2021    TIBC 272 04/07/2021    LABIRON 42 04/07/2021     Lab Results   Component Value Date    OLRPJZXV54 1173 04/07/2021    FOLATE 17.52 04/07/2021     Lab Results   Component Value Date    VITD25 18.0 04/07/2021     Lab Results   Component Value Date    LABA1C 5.7 04/07/2021    .9 04/07/2021         Current Outpatient Medications:     budesonide-formoterol (SYMBICORT) 160-4.5 MCG/ACT AERO, Inhale 2 puffs into the lungs daily, Disp: , Rfl:     montelukast (SINGULAIR) 5 MG chewable tablet, Take 5 mg by mouth nightly, Disp: , Rfl:     amLODIPine (NORVASC) 10 MG tablet, daily , Disp: , Rfl:     albuterol sulfate HFA (PROVENTIL HFA) 108 (90 Base) MCG/ACT inhaler, Inhale 2 puffs into the lungs every 4 hours as needed for Wheezing, Disp: 1 Inhaler, Rfl: 1      Review of Systems - History obtained from the patient  General ROS: negative  Psychological ROS: negative  Ophthalmic ROS: negative  Neurological ROS: negative  ENT ROS: negative  Allergy and Immunology ROS: negative  Hematological and Lymphatic ROS: negative  Endocrine ROS: negative  Breast ROS: negative  Respiratory ROS: negative  Cardiovascular ROS: negative  Gastrointestinal ROS:negative  Genito-Urinary ROS: negative  Musculoskeletal ROS: negative   Skin ROS: negative    Physical Exam   Vitals Reviewed   Constitutional: Patient is oriented to person, place, and time. Patient appears well-developed and well-nourished. Patient is active and cooperative. Non-toxic appearance. No distress. HENT:   Head: Normocephalic and atraumatic. Head is without abrasion and without laceration. Hair is normal.   Right Ear: External ear normal. No lacerations. No drainage, swelling . Left Ear: External ear normal. No lacerations. No drainage, swelling. Mouth / Nose: face mask in place  Eyes: Conjunctivae, EOM and lids are normal. Right eye exhibits no discharge. No foreign body present in the right eye. Left eye exhibits no discharge. No foreign body present in the left eye. No scleral icterus. Neck: Trachea normal and normal range of motion. No JVD present. Pulmonary/Chest: Effort normal. No accessory muscle usage or stridor. No apnea. No respiratory distress. Cardiovascular: Normal rate and no JVD. Abdominal: Normal appearance. Patient exhibits no distension. Abdomen is soft, obese, non tender. Musculoskeletal: Normal range of motion. Patient exhibits no edema. Neurological: Patient is alert and oriented to person, place, and time. Patient has normal strength. GCS eye subscore is 4. GCS verbal subscore is 5.  GCS motor subscore is 6.   Skin: Skin is warm and dry. No abrasion and no rash noted. Patient is not diaphoretic. No cyanosis or erythema. Psychiatric: Patient has a normal mood and affect. Speech is normal and behavior is normal. Cognition and memory are normal.       A/P:    Vilma Faust was seen today for bariatrics post op follow up. Diagnoses and all orders for this visit:    S/P laparoscopic sleeve gastrectomy  -     CBC with Auto Differential; Future  -     Comprehensive Metabolic Panel; Future  -     Hemoglobin A1C; Future  -     Iron and TIBC; Future  -     Lipid Panel; Future  -     TSH with Reflex; Future  -     Vitamin A; Future  -     Vitamin B1, Whole Blood; Future  -     Vitamin B12 & Folate; Future  -     Vitamin D 25 Hydroxy; Future  -     Vitamin E; Future  -     Protime-INR; Future    Prediabetes  -     CBC with Auto Differential; Future  -     Comprehensive Metabolic Panel; Future  -     Hemoglobin A1C; Future  -     Iron and TIBC; Future  -     Lipid Panel; Future  -     TSH with Reflex; Future  -     Vitamin A; Future  -     Vitamin B1, Whole Blood; Future  -     Vitamin B12 & Folate; Future  -     Vitamin D 25 Hydroxy; Future  -     Vitamin E; Future  -     Protime-INR; Future    Chronic GERD  -     CBC with Auto Differential; Future  -     Comprehensive Metabolic Panel; Future  -     Hemoglobin A1C; Future  -     Iron and TIBC; Future  -     Lipid Panel; Future  -     TSH with Reflex; Future  -     Vitamin A; Future  -     Vitamin B1, Whole Blood; Future  -     Vitamin B12 & Folate; Future  -     Vitamin D 25 Hydroxy; Future  -     Vitamin E; Future  -     Protime-INR; Future    Essential hypertension  -     CBC with Auto Differential; Future  -     Comprehensive Metabolic Panel; Future  -     Hemoglobin A1C; Future  -     Iron and TIBC; Future  -     Lipid Panel; Future  -     TSH with Reflex; Future  -     Vitamin A; Future  -     Vitamin B1, Whole Blood; Future  -     Vitamin B12 & Folate;  Future  - Vitamin D 25 Hydroxy; Future  -     Vitamin E; Future  -     Protime-INR; Future          Reji Saxena is 33 y.o. female , now with Body mass index is 35.7 kg/m². s/p Sleeve gastrectomy, has lost 4 lbs since last visit, total of 58 lbs weight loss. The patient underwent dietary counseling with registered dietician. I have reviewed, discussed and agree with the dietary plan. Patient is trying hard to keep good dietary and behavior modifications. Patient is monitoring portion sizes, food choices and liquid calories. Patient is trying to exercise regularly. Patient pleased with the surgery outcomes. I encouraged the patient to continue exercise and keeping healthy eating habits. Also counseled the patient extensively on post surgery care. Total encounter time: 31 minutes, including any number of the following: Bariatric Post operative work up/protocols, review of labs, imaging, provider notes, outside hospital records, performing examination/evaluation, counseling patient and/or family, ordering medications/tests, placing referrals and communication with referring physicians, coordination of care; discussing dietary plan/recall with the patient as well with registered dietitian and documentation in the EHR. Of note, the above was done during same day of the actual patient encounter. Dora Booker is here for her 5-month status post sleeve gastrectomy. Overall doing well. Patient weight did slow down however that could be explained by her low caloric intake. Advised the patient that she needs to track her intake better and needs to ensure she is getting 1000-calorie instead of 800. We will see the patient back in 2 months for continued follow-up. Obesity as a disease is considered a high risk to patients overall health and should therefore be considered a high risk disease state.    Advised the patient that not getting there weight under control, which hopefully would help with getting some of the comorbidities under control. That could increase risk of complications/worsening of those conditions on the long-term. Now with Covid-19 pandemic, CDC and health authorities does classify obese patients as vulnerable and high risk as well. Which makes weight loss a priority for improvement of their wellbeing and overall health. We discussed how her excess weight affects her overall health and importance of weight loss, healthy diet and active lifestyle to alleviate those co morbid conditions, otherwise risk deterioration.       - RTC in 2 months  - Nutrition labs         Patient advised that its their responsibility to follow up for care, studies, referrals and/or labs ordered today. Please note that some or all of this report was generated using voice recognition software. Please notify me in case of any questions about the content of this document, as some errors in transcription may have occurred .

## 2022-03-24 NOTE — PROGRESS NOTES
Dietary Assessment Note  Vitals:   Vitals:    03/24/22 0952   BP: 123/82   Pulse: 91   Resp: 18   SpO2: 98%   Weight: 208 lb (94.3 kg)   Height: 5' 4\" (1.626 m)   Patient lost 4 lbs over past ~2 months. Total Weight Loss: 57.8 lbs    Labs reviewed: no new labs    Protein intake: 60-80 grams/day     Fluid intake: 48-64 oz/day- 60oz: water / gatorade zero w/ protein    Multivitamin/mineral intake: yes - 4 fusion per day    Calcium intake: no    Other: Vit 12 / hair-skin-nails    Exercise: yes - kids keep her busy, no formal exercise, eager to start [facility]    Nutrition Assessment: 5 month post-op visit.      B- protein shake   S- HB egg  L- CC & fruit w/ lunch meat  S- sometimes 1/4 cup nuts  D- ground turkey w/ lettuce/cheese (taco salad), always a vegetable  S- none    Amount able to eat per sitting: ~1/2 cup volume    Following 30/30/30 rule: yes    Food Intolerances/issues: none    Client Concerns: none    Goals:   - Sign up / start [facility]    Handout: alternative MVI     Plan: f/u in 2 months OR as directed    Olga Silva RD, LD

## 2022-06-23 ENCOUNTER — TELEMEDICINE (OUTPATIENT)
Dept: BARIATRICS/WEIGHT MGMT | Age: 32
End: 2022-06-23
Payer: MEDICAID

## 2022-06-23 VITALS — HEIGHT: 64 IN | BODY MASS INDEX: 34.15 KG/M2 | WEIGHT: 200 LBS

## 2022-06-23 DIAGNOSIS — K21.9 CHRONIC GERD: Primary | ICD-10-CM

## 2022-06-23 DIAGNOSIS — G89.29 CHRONIC MIDLINE LOW BACK PAIN WITH RIGHT-SIDED SCIATICA: ICD-10-CM

## 2022-06-23 DIAGNOSIS — R73.03 PREDIABETES: ICD-10-CM

## 2022-06-23 DIAGNOSIS — Z98.84 S/P LAPAROSCOPIC SLEEVE GASTRECTOMY: ICD-10-CM

## 2022-06-23 DIAGNOSIS — I10 ESSENTIAL HYPERTENSION: ICD-10-CM

## 2022-06-23 DIAGNOSIS — M54.41 CHRONIC MIDLINE LOW BACK PAIN WITH RIGHT-SIDED SCIATICA: ICD-10-CM

## 2022-06-23 PROBLEM — Z3A.41 POST TERM PREGNANCY AT 41 WEEKS GESTATION: Status: RESOLVED | Noted: 2017-08-07 | Resolved: 2022-06-23

## 2022-06-23 PROBLEM — O48.0 POST TERM PREGNANCY AT 41 WEEKS GESTATION: Status: RESOLVED | Noted: 2017-08-07 | Resolved: 2022-06-23

## 2022-06-23 PROBLEM — E66.9 OBESITY (BMI 30.0-34.9): Status: ACTIVE | Noted: 2021-04-23

## 2022-06-23 PROCEDURE — 99213 OFFICE O/P EST LOW 20 MIN: CPT | Performed by: SURGERY

## 2022-06-23 PROCEDURE — 1036F TOBACCO NON-USER: CPT | Performed by: SURGERY

## 2022-06-23 PROCEDURE — G8427 DOCREV CUR MEDS BY ELIG CLIN: HCPCS | Performed by: SURGERY

## 2022-06-23 PROCEDURE — G8417 CALC BMI ABV UP PARAM F/U: HCPCS | Performed by: SURGERY

## 2022-06-23 NOTE — PROGRESS NOTES
Dietary Assessment Note  Vitals:   Vitals:    06/23/22 1030   Weight: 200 lb (90.7 kg)   Height: 5' 4\" (1.626 m)    Patient lost 8 lbs over past ~3 months, per pt report. Total Weight Loss: 65.8 lbs    Labs reviewed: no new labs    Protein intake: 60-80 grams/day- using protein shakes routinely     Fluid intake: >64 oz/day- water     Multivitamin/mineral intake: yes - 4 fusion    Calcium intake: no    Other: B12    Exercise: yes - walks 3x/wk for 1.5 hours    Nutrition Assessment: 8 month post-op visit.      B- protein shake & protein shake  S- blueberries OR nuts  L- chicken salad OR tuna OR CC w/ strawberries  S- opposite of AM snack  D- zoodle w/ ground turkey OR tacos (no shell)  S- none    Amount able to eat per sitting: ~1/2 cup volume    Following 30/30/30 rule: yes    Food Intolerances/issues: none    Client Concerns: none    Goals:   - Continue plan    Next wt goal <200 lb    Plan: f/u in 2-3 months OR as directed    Tom Dawson, RD, LD

## 2023-07-18 RX ORDER — CHOLECALCIFEROL (VITAMIN D3) 1250 MCG
CAPSULE ORAL
Qty: 4 CAPSULE | Refills: 2 | OUTPATIENT
Start: 2023-07-18

## 2024-01-15 ENCOUNTER — TELEPHONE (OUTPATIENT)
Dept: BARIATRICS/WEIGHT MGMT | Age: 34
End: 2024-01-15

## 2024-05-05 NOTE — PROGRESS NOTES
[facility]) Physicians   [employer]  Glo Ayoub MD, [address] [facility], [address] [place] [address]    [facility] [address] . Phone: [phone]  Fax: [phone]        TELEHEALTH EVALUATION -- Audio/Visual (During AYTEM-63 public health emergency)     Chief Complaint   Patient presents with   [facility] Post Op Follow Up           HPI:        Lily Choudhury was evaluated through a synchronous (real-time) audio-video encounter. The patient (or guardian if applicable) is aware that this is a billable service, which includes applicable co-pays. This Virtual Visit was conducted with patient's (and/or legal guardian's) consent. The visit was conducted pursuant to the emergency declaration under the [address] [place] [address], [facility] authority and the [email or web address] and [employer] General Act. Patient identification was verified, and a caregiver was present when appropriate. The patient was located in a state where the provider was licensed to provide care     Lily Choudhury is a very pleasant 33 y.o.  female , Body mass index is 34.33 kg/m². Charlie Joss And multiple medical problems who is presenting for bariatric follow up care. Bentley Escobar is s/p laparoscopic sleeve gastrectomy by me 10/2021. Initial Weight: 265 lbs, Weight Loss: 66 lbs. Patient weight is 200 lbs, BMI is 34.3 kg/m². Comes today to the clinic without any complaints. Patient denies any nausea, vomiting, fevers, chills, shortness of breath, chest pain, constipation or urinary symptoms. Denies any heartburn nor dysphagia. Bentley Escobar is feeling very well, and is very active. Patient is very pleased with the weight loss and resolution of co-morbid conditions.       Pain Assessment   Denies any abdominal pain     Past Medical History:   Diagnosis Date    Asthma     associated with seasonal allergies    Hypertension     Pruritic urticarial papules and plaques of pregnancy      Past Surgical History:   Procedure Laterality Date    SLEEVE GASTRECTOMY N/A 10/11/2021    LAPAROSCOIC SLEEVE GASTRECTOMY AND VENTRAL HERNIA REPAIR performed by Adalid Garcia MD at [address] ENDOSCOPY N/A 4/23/2021    EGD BIOPSY performed by Adalid Garcia MD at [address] [facility] ENDOSCOPY     Family History   Adopted: Yes     Social History     Tobacco Use    Smoking status: Never Smoker    Smokeless tobacco: Never Used   Substance Use Topics    Alcohol use: Not Currently     Comment: socially      I counseled the patient on the importance of not smoking and risks of ETOH. Allergies   Allergen Reactions    Other      Cats and Dogs     Vitals:    06/23/22 1030   Weight: 200 lb (90.7 kg)   Height: 5' 4\" (1.626 m)       Body mass index is 34.33 kg/m².       Lab Results   Component Value Date    WBC 16.0 10/12/2021    RBC 4.59 10/12/2021    HGB 13.4 10/12/2021    HCT 39.9 10/12/2021    MCV 86.8 10/12/2021    MCH 29.3 10/12/2021    MCHC 33.7 10/12/2021    MPV 7.5 10/12/2021    NEUTOPHILPCT 59.3 04/07/2021    LYMPHOPCT 23.8 04/07/2021    MONOPCT 8.2 04/07/2021    EOSRELPCT 8.0 04/07/2021    BASOPCT 0.7 04/07/2021    NEUTROABS 4.7 04/07/2021    LYMPHSABS 1.9 04/07/2021    MONOSABS 0.7 04/07/2021    EOSABS 0.6 04/07/2021     Lab Results   Component Value Date     10/12/2021    K 4.3 10/12/2021     10/12/2021    CO2 19 10/12/2021    ANIONGAP 12 10/12/2021    GLUCOSE 91 10/12/2021    BUN 5 10/12/2021    CREATININE 0.6 10/12/2021    LABGLOM >60 10/12/2021    GFRAA >60 10/12/2021    GFRAA >60 08/22/2010    CALCIUM 8.7 10/12/2021    PROT 6.9 10/05/2021    PROT 5.9 08/22/2010    LABALBU 4.3 10/05/2021    AGRATIO 1.7 10/05/2021    BILITOT 0.5 10/05/2021    ALKPHOS 59 10/05/2021    ALT 18 10/05/2021    AST 18 10/05/2021    GLOB 2.6 10/05/2021     Lab Results   Component Value Date    CHOL 158 04/07/2021    TRIG 130 04/07/2021    HDL 42 04/07/2021    LDLCALC 90 04/07/2021 LABVLDL 26 04/07/2021     Lab Results   Component Value Date    TSHREFLEX 1.59 04/07/2021     Lab Results   Component Value Date    IRON 115 04/07/2021    TIBC 272 04/07/2021    LABIRON 42 04/07/2021     Lab Results   Component Value Date    [id number] 1173 04/07/2021    FOLATE 17.52 04/07/2021     Lab Results   Component Value Date    VITD25 18.0 04/07/2021     Lab Results   Component Value Date    LABA1C 5.7 04/07/2021    .9 04/07/2021         Current Outpatient Medications:     budesonide-formoterol (SYMBICORT) 160-4.5 MCG/ACT AERO, Inhale 2 puffs into the lungs daily, Disp: , Rfl:     montelukast (SINGULAIR) 5 MG chewable tablet, Take 5 mg by mouth nightly, Disp: , Rfl:     amLODIPine (NORVASC) 10 MG tablet, daily , Disp: , Rfl:     albuterol sulfate HFA (PROVENTIL HFA) 108 (90 Base) MCG/ACT inhaler, Inhale 2 puffs into the lungs every 4 hours as needed for Wheezing, Disp: 1 Inhaler, Rfl: 1      Review of Systems - History obtained from the patient  General ROS: negative  Psychological ROS: negative  Ophthalmic ROS: negative  Neurological ROS: negative  ENT ROS: negative  Allergy and Immunology ROS: negative  Hematological and Lymphatic ROS: negative  Endocrine ROS: negative  Breast ROS: negative  Respiratory ROS: negative  Cardiovascular ROS: negative  Gastrointestinal ROS:negative  Genito-Urinary ROS: negative  Musculoskeletal ROS: negative   Skin ROS: negative    Physical Exam   Deferred due to pandemic     A/P:    Jarad Foster was seen today for bariatrics post op follow up. Diagnoses and all orders for this visit:    Chronic GERD    Essential hypertension    Chronic midline low back pain with right-sided sciatica    Prediabetes    S/P laparoscopic sleeve gastrectomy          Percell Goldberg is 33 y.o. female , now with Body mass index is 34.33 kg/m². s/p Sleeve gastrectomy, has lost 8 lbs since last visit, total of 66 lbs weight loss.  The patient underwent dietary counseling with myself &/or registered dietician. I have reviewed, discussed and agree with the dietary plan. Patient is trying hard to keep good dietary and behavior modifications. Patient is monitoring portion sizes, food choices and liquid calories. Patient is trying to exercise regularly. Patient pleased with the surgery outcomes. We discussed how her weight affects her overall health including:  Patient Active Problem List   Diagnosis     (spontaneous vaginal delivery)    Essential hypertension    Chronic GERD    Chronic midline low back pain with right-sided sciatica    Prediabetes    Vitamin D deficiency    Obesity (BMI 30.0-34. 9)    Ventral hernia without obstruction or gangrene    S/P laparoscopic sleeve gastrectomy    and importance of weight loss to alleviate those co morbid conditions. I encouraged the patient to continue exercise and keeping healthy eating habits. Also counseled the patient extensively on post surgery care. Obesity as a disease is considered a high risk to patients overall health and should therefore be considered a high risk disease state. Today's encounter time spent was 21 minutes, including  any number of the following: Bariatric Post operative work up/protocols, review of labs, imaging, provider notes, outside hospital records, performing examination/evaluation, counseling patient and/or family, ordering medications/tests, placing referrals and communication with referring physicians, coordination of care; discussing dietary plan/recall with the patient as well with registered dietitian and documentation in the EHR. Of note, the above was done during same day of the actual patient encounter. Yared Hill for her 8 months follow-up. Patient overall doing well. He is at work but tries to walk every evening. Patient has no complaints. Patient did not get her blood work done and encouraged her to get that done as soon as possible.   The patient back in 2 months for continued follow-up      History of Obesity: now Body mass index is 34.33 kg/m². [x] Continue to make dietary and lifestyle modifications. [x] S/P laparoscopic sleeve gastrectomy. [x] Get Nutrition labs done. [x] Return for follow-up in 2 month. Chronic GERD:   [x] Continue to make dietary and lifestyle modifications. [x] Continue PPI or H2 Blocker. Essential Hypertension:  [x] Continue to make dietary and lifestyle modifications. [x] Reviewed the importance of checking blood pressure. [x] Continue to follow up with their PCP for medication management and monitoring. Prediabetes:   [x] Continue to make dietary and lifestyle modifications. [x] Reviewed the importance of checking blood sugars. [x] Continue to follow up with their PCP for medication management and monitoring. Chronic midline low back pain with right-sided sciatica:  [x] Continue to make dietary and lifestyle modifications. [x] Continue with weight loss. Patient advised that its their responsibility to follow up for care, studies, referrals and/or labs ordered today. Pursuant to the emergency declaration under the [facility], [facility] waiver authority and the [employer] and [employer] Act, this Virtual Visit was conducted, with patient's consent, to reduce the patient's risk of exposure to COVID-19 and provide continuity of care for an established patient. Services were provided through a video synchronous / telephone discussion virtually to substitute for in-person clinic visit. Please note that some or all of this report was generated using voice recognition software. Please notify me in case of any questions about the content of this document, as some errors in transcription may have occurred . [employer]

## (undated) DEVICE — GOWN,AURORA,NONREINF,RAGLAN,XXL,STERILE: Brand: MEDLINE

## (undated) DEVICE — SET VLV 3 PC AWS DISPOSABLE GRDIAN SCOPEVALET

## (undated) DEVICE — LOTION PREP REMV 5OZ IODO CLR TINC OF BENZ DURAPREP

## (undated) DEVICE — RELOAD STPL 2.5MM L60MM 0DEG VASC TISS TAN TI 6 ROW LIN

## (undated) DEVICE — BLANKET WRM W29.9XL79.1IN UP BODY FORC AIR MISTRAL-AIR

## (undated) DEVICE — ADHESIVE SKIN CLSR 0.7ML TOP DERMBND ADV

## (undated) DEVICE — DRAPE,LAP,CHOLE,W/TROUGHS,STERILE: Brand: MEDLINE

## (undated) DEVICE — APPLICATOR PREP 26ML 0.7% IOD POVACRYLEX 74% ISO ALC ST

## (undated) DEVICE — ARM CRADLE: Brand: DEVON

## (undated) DEVICE — TROCARS: Brand: KII® OPTICAL ACCESS SYSTEM

## (undated) DEVICE — SOLUTION IV IRRIG WATER 500ML POUR BRL ST 2F7113

## (undated) DEVICE — 30977 SEE SHARP - ENHANCED INTRAOPERATIVE LAPAROSCOPE CLEANING & DEFOGGING: Brand: 30977 SEE SHARP - ENHANCED INTRAOPERATIVE LAPAROSCOPE CLEANING & DEFOGGING

## (undated) DEVICE — SUTURE VCRL PLUS SZ 0 54IN TIE VCP608H

## (undated) DEVICE — SHEET,DRAPE,53X77,STERILE: Brand: MEDLINE

## (undated) DEVICE — Device

## (undated) DEVICE — STERILE POLYISOPRENE POWDER-FREE SURGICAL GLOVES: Brand: PROTEXIS

## (undated) DEVICE — SYRINGE, LUER LOCK, 10ML: Brand: MEDLINE

## (undated) DEVICE — RELOAD STPL 3.5MM L60MM 0DEG UNIV TISS PUR TI 6 ROW LIN

## (undated) DEVICE — SPONGE,LAP,4"X18",XR,ST,5/PK,40PK/CS: Brand: MEDLINE INDUSTRIES, INC.

## (undated) DEVICE — LAPAROSCOPY PACK: Brand: MEDLINE INDUSTRIES, INC.

## (undated) DEVICE — SUTURE VCRL + SZ 4-0 L18IN ABSRB UD L19MM PS-2 3/8 CIR PRIM VCP496H

## (undated) DEVICE — LAPAROSCOPIC SCISSORS: Brand: EPIX LAPAROSCOPIC SCISSORS

## (undated) DEVICE — TROCAR: Brand: KII OPTICAL ACCESS SYSTEM

## (undated) DEVICE — SOLUTION IRRIG 1000ML 0.9% SOD CHL USP POUR PLAS BTL

## (undated) DEVICE — NEEDLE HYPO 22GA L1.5IN BLK POLYPR HUB S STL REG BVL STR

## (undated) DEVICE — SHELL STPL PWR FOR SIGNIA HNDL STPL SYS

## (undated) DEVICE — TRUE CONTENT TO BE POPULATED AS PART OF REBRANDING: Brand: ARGYLE

## (undated) DEVICE — MOUTHPIECE ENDOSCP L CTRL OPN AND SIDE PORTS DISP

## (undated) DEVICE — DEVICE SUT SHFT L34CM DIA 10MM 2 JAW LD UNIT ENDOSTCH

## (undated) DEVICE — FORCEPS BX L240CM WRK CHN 2.8MM STD CAP W/ NDL MIC MESH

## (undated) DEVICE — TROCAR: Brand: KII FIOS FIRST ENTRY

## (undated) DEVICE — PASSIVE LAPSCP FLTR W/ 1/4INX24 TBNG AND M LUER LCK FIT - U

## (undated) DEVICE — BW-412T DISP COMBO CLEANING BRUSH: Brand: SINGLE USE COMBINATION CLEANING BRUSH

## (undated) DEVICE — BOWL MED L 32OZ PLAS W/ MOLD GRAD EZ OPN PEEL PCH

## (undated) DEVICE — MERCY FAIRFIELD TURNOVER KIT: Brand: MEDLINE INDUSTRIES, INC.

## (undated) DEVICE — SUTURE ETHBND EXCEL SZ 0 L30IN NONABSORBABLE GRN CT1 L36MM X424H

## (undated) DEVICE — CRADLE ANK AND FT ELEV FLAT END POLY FOAM W/ VENT H NEUT

## (undated) DEVICE — PMI DISPOSABLE PUNCTURE CLOSURE DEVICE / SUTURE GRASPER: Brand: PMI

## (undated) DEVICE — DEVICE SUT W/ SZ 0 L48IN VLT POLYSRB SUT DISP ES-9 ENDO

## (undated) DEVICE — SHEARS ENDOSCP HARM 36CM ULTRASONIC CRV TIP UPGRD

## (undated) DEVICE — AIR SHEET,LAT,COMFORT GLIDE, BLEND 40X80: Brand: MEDLINE

## (undated) DEVICE — NEEDLE INSUF L150MM DIA2MM DISP FOR PNEUMOPERI ENDOPATH

## (undated) DEVICE — SHEET,DRAPE,40X58,STERILE: Brand: MEDLINE

## (undated) DEVICE — PROCEDURE KIT ENDOSCP CUST